# Patient Record
Sex: MALE | Race: WHITE | NOT HISPANIC OR LATINO | Employment: OTHER
[De-identification: names, ages, dates, MRNs, and addresses within clinical notes are randomized per-mention and may not be internally consistent; named-entity substitution may affect disease eponyms.]

---

## 2023-05-21 ENCOUNTER — HEALTH MAINTENANCE LETTER (OUTPATIENT)
Age: 38
End: 2023-05-21

## 2023-06-21 ENCOUNTER — OFFICE VISIT (OUTPATIENT)
Dept: FAMILY MEDICINE | Facility: CLINIC | Age: 38
End: 2023-06-21
Payer: MEDICARE

## 2023-06-21 VITALS
WEIGHT: 168.1 LBS | TEMPERATURE: 98.5 F | HEART RATE: 84 BPM | BODY MASS INDEX: 27.02 KG/M2 | SYSTOLIC BLOOD PRESSURE: 138 MMHG | OXYGEN SATURATION: 97 % | HEIGHT: 66 IN | DIASTOLIC BLOOD PRESSURE: 86 MMHG | RESPIRATION RATE: 10 BRPM

## 2023-06-21 DIAGNOSIS — K86.1 CHRONIC PANCREATITIS, UNSPECIFIED PANCREATITIS TYPE (H): ICD-10-CM

## 2023-06-21 DIAGNOSIS — R12 HEARTBURN: ICD-10-CM

## 2023-06-21 DIAGNOSIS — Z76.89 ENCOUNTER TO ESTABLISH CARE: Primary | ICD-10-CM

## 2023-06-21 DIAGNOSIS — E10.40 TYPE 1 DIABETES MELLITUS WITH DIABETIC NEUROPATHY (H): ICD-10-CM

## 2023-06-21 DIAGNOSIS — Z00.00 ENCOUNTER FOR MEDICARE ANNUAL WELLNESS EXAM: ICD-10-CM

## 2023-06-21 DIAGNOSIS — I10 HYPERTENSION, UNSPECIFIED TYPE: ICD-10-CM

## 2023-06-21 DIAGNOSIS — R12 HEARTBURN: Primary | ICD-10-CM

## 2023-06-21 DIAGNOSIS — L73.2 HIDRADENITIS SUPPURATIVA: ICD-10-CM

## 2023-06-21 DIAGNOSIS — F41.9 ANXIETY: ICD-10-CM

## 2023-06-21 DIAGNOSIS — K86.2 PANCREATIC CYST: ICD-10-CM

## 2023-06-21 DIAGNOSIS — F33.41 RECURRENT MAJOR DEPRESSIVE DISORDER, IN PARTIAL REMISSION (H): ICD-10-CM

## 2023-06-21 PROBLEM — K40.90 RIGHT INGUINAL HERNIA: Status: ACTIVE | Noted: 2018-10-02

## 2023-06-21 PROBLEM — F32.4 MAJOR DEPRESSIVE DISORDER, SINGLE EPISODE, IN PARTIAL REMISSION (H): Status: ACTIVE | Noted: 2017-06-05

## 2023-06-21 LAB
ALBUMIN SERPL BCG-MCNC: 4.4 G/DL (ref 3.5–5.2)
ALP SERPL-CCNC: 72 U/L (ref 40–129)
ALT SERPL W P-5'-P-CCNC: 39 U/L (ref 0–70)
ANION GAP SERPL CALCULATED.3IONS-SCNC: 9 MMOL/L (ref 7–15)
AST SERPL W P-5'-P-CCNC: 29 U/L (ref 0–45)
BILIRUB SERPL-MCNC: 0.4 MG/DL
BUN SERPL-MCNC: 21.7 MG/DL (ref 6–20)
CALCIUM SERPL-MCNC: 9.4 MG/DL (ref 8.6–10)
CHLORIDE SERPL-SCNC: 108 MMOL/L (ref 98–107)
CHOLEST SERPL-MCNC: 117 MG/DL
CREAT SERPL-MCNC: 0.67 MG/DL (ref 0.67–1.17)
DEPRECATED HCO3 PLAS-SCNC: 26 MMOL/L (ref 22–29)
GFR SERPL CREATININE-BSD FRML MDRD: >90 ML/MIN/1.73M2
GLUCOSE SERPL-MCNC: 57 MG/DL (ref 70–99)
HBA1C MFR BLD: 6.6 %
HDLC SERPL-MCNC: 53 MG/DL
LDLC SERPL CALC-MCNC: 56 MG/DL
NONHDLC SERPL-MCNC: 64 MG/DL
POTASSIUM SERPL-SCNC: 4.2 MMOL/L (ref 3.4–5.3)
PROT SERPL-MCNC: 7.2 G/DL (ref 6.4–8.3)
SODIUM SERPL-SCNC: 143 MMOL/L (ref 136–145)
TRIGL SERPL-MCNC: 38 MG/DL

## 2023-06-21 PROCEDURE — 80061 LIPID PANEL: CPT | Performed by: STUDENT IN AN ORGANIZED HEALTH CARE EDUCATION/TRAINING PROGRAM

## 2023-06-21 PROCEDURE — 80053 COMPREHEN METABOLIC PANEL: CPT | Performed by: STUDENT IN AN ORGANIZED HEALTH CARE EDUCATION/TRAINING PROGRAM

## 2023-06-21 PROCEDURE — 99207 PR FOOT EXAM NO CHARGE: CPT | Mod: 25 | Performed by: STUDENT IN AN ORGANIZED HEALTH CARE EDUCATION/TRAINING PROGRAM

## 2023-06-21 PROCEDURE — 99214 OFFICE O/P EST MOD 30 MIN: CPT | Mod: 25 | Performed by: STUDENT IN AN ORGANIZED HEALTH CARE EDUCATION/TRAINING PROGRAM

## 2023-06-21 PROCEDURE — G0438 PPPS, INITIAL VISIT: HCPCS | Performed by: STUDENT IN AN ORGANIZED HEALTH CARE EDUCATION/TRAINING PROGRAM

## 2023-06-21 PROCEDURE — 83036 HEMOGLOBIN GLYCOSYLATED A1C: CPT | Performed by: STUDENT IN AN ORGANIZED HEALTH CARE EDUCATION/TRAINING PROGRAM

## 2023-06-21 PROCEDURE — 36415 COLL VENOUS BLD VENIPUNCTURE: CPT | Performed by: STUDENT IN AN ORGANIZED HEALTH CARE EDUCATION/TRAINING PROGRAM

## 2023-06-21 RX ORDER — HUMAN INSULIN 100 [IU]/ML
25 INJECTION, SUSPENSION SUBCUTANEOUS 2 TIMES DAILY
Qty: 30 ML | Refills: 3 | Status: SHIPPED | OUTPATIENT
Start: 2023-06-21 | End: 2023-08-17

## 2023-06-21 RX ORDER — BUSPIRONE HYDROCHLORIDE 5 MG/1
1 TABLET ORAL 3 TIMES DAILY
COMMUNITY
Start: 2023-02-24 | End: 2023-06-21

## 2023-06-21 RX ORDER — ONDANSETRON 8 MG/1
TABLET, ORALLY DISINTEGRATING ORAL
COMMUNITY
Start: 2023-03-01 | End: 2023-06-21

## 2023-06-21 RX ORDER — DICYCLOMINE HYDROCHLORIDE 10 MG/1
10 CAPSULE ORAL
COMMUNITY
Start: 2023-02-24 | End: 2023-06-21

## 2023-06-21 RX ORDER — METOCLOPRAMIDE 5 MG/1
5 TABLET ORAL
COMMUNITY
Start: 2023-03-01 | End: 2023-06-21

## 2023-06-21 RX ORDER — LISINOPRIL 5 MG/1
1 TABLET ORAL DAILY
COMMUNITY
Start: 2023-03-01 | End: 2023-06-21

## 2023-06-21 RX ORDER — ATORVASTATIN CALCIUM 20 MG/1
20 TABLET, FILM COATED ORAL DAILY
Qty: 90 TABLET | Refills: 3 | Status: SHIPPED | OUTPATIENT
Start: 2023-06-21

## 2023-06-21 RX ORDER — LISINOPRIL 10 MG/1
10 TABLET ORAL DAILY
Qty: 90 TABLET | Refills: 3 | Status: SHIPPED | OUTPATIENT
Start: 2023-06-21

## 2023-06-21 RX ORDER — DULOXETIN HYDROCHLORIDE 20 MG/1
20 CAPSULE, DELAYED RELEASE ORAL DAILY
Qty: 90 CAPSULE | Refills: 3 | Status: SHIPPED | OUTPATIENT
Start: 2023-06-21

## 2023-06-21 RX ORDER — BUSPIRONE HYDROCHLORIDE 5 MG/1
5 TABLET ORAL 3 TIMES DAILY
Qty: 90 TABLET | Refills: 3 | Status: ON HOLD | OUTPATIENT
Start: 2023-06-21 | End: 2023-07-09

## 2023-06-21 RX ORDER — DOXYCYCLINE 100 MG/1
100 CAPSULE ORAL 2 TIMES DAILY
Qty: 14 CAPSULE | Refills: 0 | Status: SHIPPED | OUTPATIENT
Start: 2023-06-21 | End: 2023-06-28

## 2023-06-21 RX ORDER — ONDANSETRON 8 MG/1
TABLET, ORALLY DISINTEGRATING ORAL
Qty: 20 TABLET | Refills: 0 | Status: SHIPPED | OUTPATIENT
Start: 2023-06-21

## 2023-06-21 RX ORDER — BLOOD SUGAR DIAGNOSTIC
1 STRIP MISCELLANEOUS
COMMUNITY
Start: 2023-02-24

## 2023-06-21 RX ORDER — DICYCLOMINE HYDROCHLORIDE 10 MG/1
10 CAPSULE ORAL 4 TIMES DAILY PRN
Qty: 90 CAPSULE | Refills: 0 | Status: ON HOLD | OUTPATIENT
Start: 2023-06-21 | End: 2023-07-07

## 2023-06-21 RX ORDER — METOCLOPRAMIDE 5 MG/1
5 TABLET ORAL 3 TIMES DAILY PRN
Qty: 90 TABLET | Refills: 0 | Status: SHIPPED | OUTPATIENT
Start: 2023-06-21

## 2023-06-21 RX ORDER — GABAPENTIN 300 MG/1
300 CAPSULE ORAL 3 TIMES DAILY
Qty: 90 CAPSULE | Refills: 3 | Status: SHIPPED | OUTPATIENT
Start: 2023-06-21

## 2023-06-21 RX ORDER — HUMAN INSULIN 100 [IU]/ML
INJECTION, SUSPENSION SUBCUTANEOUS
COMMUNITY
Start: 2023-03-01 | End: 2023-06-21

## 2023-06-21 RX ORDER — BLOOD SUGAR DIAGNOSTIC
STRIP MISCELLANEOUS
COMMUNITY
Start: 2023-02-24

## 2023-06-21 RX ORDER — GABAPENTIN 300 MG/1
1 CAPSULE ORAL 3 TIMES DAILY
COMMUNITY
Start: 2023-02-24 | End: 2023-06-21

## 2023-06-21 RX ORDER — DULOXETIN HYDROCHLORIDE 20 MG/1
1 CAPSULE, DELAYED RELEASE ORAL DAILY
COMMUNITY
Start: 2023-02-24 | End: 2023-06-21

## 2023-06-21 ASSESSMENT — ENCOUNTER SYMPTOMS
CHILLS: 1
WEAKNESS: 1
SHORTNESS OF BREATH: 1
FEVER: 1
NAUSEA: 1
DIZZINESS: 1
MYALGIAS: 1
NERVOUS/ANXIOUS: 1
ABDOMINAL PAIN: 1
CONSTIPATION: 1

## 2023-06-21 ASSESSMENT — ACTIVITIES OF DAILY LIVING (ADL): CURRENT_FUNCTION: NO ASSISTANCE NEEDED

## 2023-06-21 NOTE — PROGRESS NOTES
"SUBJECTIVE:   CC: Elias is an 37 year old who presents for preventative health visit.        No data to display              Healthy Habits:     In general, how would you rate your overall health?  Fair    Frequency of exercise:  2-3 days/week    Duration of exercise:  15-30 minutes    Do you usually eat at least 4 servings of fruit and vegetables a day, include whole grains    & fiber and avoid regularly eating high fat or \"junk\" foods?  Yes    Taking medications regularly:  No    Barriers to taking medications:  None    Medication side effects:  Not applicable    Ability to successfully perform activities of daily living:  No assistance needed    Home Safety:  No safety concerns identified    Hearing Impairment:  No hearing concerns    In the past 6 months, have you been bothered by leaking of urine?  No    In general, how would you rate your overall mental or emotional health?  Good      PHQ-2 Total Score: 1    Additional concerns today:  Yes        {Add if <65 person on Medicare  - Required Questions (Optional):610816}  {Outside tests to abstract? :598840}    {additional problems to add (Optional):512312}    Have you ever done Advance Care Planning? (For example, a Health Directive, POLST, or a discussion with a medical provider or your loved ones about your wishes): { :091495}    Social History     Tobacco Use     Smoking status: Not on file     Smokeless tobacco: Not on file   Substance Use Topics     Alcohol use: Not on file     {Rooming staff  Click this link to complete the Prescreen if response below is not for today's visit  Alcohol Use Prescreen >3 drinks/day or > 7 drinks/week.  If the prescreen question answer is YES, complete the full AUDIT  :389549}         No data to display            {add AUDIT responses (Optional) (A score of 7 for adult men is an indication of hazardous drinking; a score of 8 or more is an indication of an alcohol use disorder.  A score of 7 or more for adult women is an " "indication of hazardous drinking or an alchohol use disorder):781935}    Last PSA: No results found for: PSA    Reviewed orders with patient. Reviewed health maintenance and updated orders accordingly - { :356548::\"Yes\"}  {Chronicprobdata (optional):209656}    Reviewed and updated as needed this visit by clinical staff                  Reviewed and updated as needed this visit by Provider                 {HISTORY OPTIONS (Optional):419851}    Review of Systems   Constitutional: Positive for chills and fever.   Eyes: Positive for visual disturbance.   Respiratory: Positive for shortness of breath.    Cardiovascular: Positive for chest pain.   Gastrointestinal: Positive for abdominal pain, constipation and nausea.   Genitourinary: Positive for genital sores, penile discharge and urgency.   Musculoskeletal: Positive for myalgias.   Neurological: Positive for dizziness and weakness.   Psychiatric/Behavioral: Positive for mood changes. The patient is nervous/anxious.      {MALE ROS (Optional):819949::\"CONSTITUTIONAL: NEGATIVE for fever, chills, change in weight\",\"INTEGUMENTARY/SKIN: NEGATIVE for worrisome rashes, moles or lesions\",\"EYES: NEGATIVE for vision changes or irritation\",\"ENT: NEGATIVE for ear, mouth and throat problems\",\"RESP: NEGATIVE for significant cough or SOB\",\"CV: NEGATIVE for chest pain, palpitations or peripheral edema\",\"GI: NEGATIVE for nausea, abdominal pain, heartburn, or change in bowel habits\",\" male: negative for dysuria, hematuria, decreased urinary stream, erectile dysfunction, urethral discharge\",\"MUSCULOSKELETAL: NEGATIVE for significant arthralgias or myalgia\",\"NEURO: NEGATIVE for weakness, dizziness or paresthesias\",\"PSYCHIATRIC: NEGATIVE for changes in mood or affect\"}    OBJECTIVE:   BP (!) 138/90   Pulse 84   Temp 98.5  F (36.9  C)   Resp 10   Ht 1.685 m (5' 6.34\")   Wt 76.2 kg (168 lb 1.6 oz)   SpO2 97%   BMI 26.86 kg/m      Physical Exam  {Exam Choices " "(Optional):767103}    {Diagnostic Test Results (Optional):838643::\"Diagnostic Test Results:\",\"Labs reviewed in Epic\"}    ASSESSMENT/PLAN:   {Diag Picklist:610654}    {Patient advised of split billing (Optional):638278}      COUNSELING:   {MALE COUNSELING MESSAGES:942739::\"Reviewed preventive health counseling, as reflected in patient instructions\"}        He has no history on file for tobacco use.      {Counseling Resources  [place] Preventive Services Task Force  Cholesterol Screening  Health diet/nutrition  Pooled Cohorts Equation Calculator  USDA's MyPlate  ASA Prophylaxis  Lung CA Screening  Osteoporosis prevention/bone health :522553}  {Prostate Cancer Screening  Consider for men 55-69 per guidance from USPSTF :992933}    Nahun Clemente MD  [facility]  "

## 2023-06-21 NOTE — PATIENT INSTRUCTIONS
Patient Education   Personalized Prevention Plan  You are due for the preventive services outlined below.  Your care team is available to assist you in scheduling these services.  If you have already completed any of these items, please share that information with your care team to update in your medical record.  Health Maintenance Due   Topic Date Due     Talk to your care team about options to quit tobacco use.  Never done     ANNUAL REVIEW OF HM ORDERS  Never done     Discuss Advance Care Planning  Never done     COVID-19 Vaccine (1) Never done     HIV Screening  Never done     Hepatitis C Screening  Never done     Cholesterol Lab  Never done     Your Health Risk Assessment indicates you feel you are not in good health    A healthy lifestyle helps keep the body fit and the mind alert. It helps protect you from disease, helps you fight disease, and helps prevent chronic disease (disease that doesn't go away) from getting worse. This is important as you get older and begin to notice twinges in muscles and joints and a decline in the strength and stamina you once took for granted. A healthy lifestyle includes good healthcare, good nutrition, weight control, recreation, and regular exercise. Avoid harmful substances and do what you can to keep safe. Another part of a healthy lifestyle is stay mentally active and socially involved.    Good healthcare     Have a wellness visit every year.     If you have new symptoms, let us know right away. Don't wait until the next checkup.     Take medicines exactly as prescribed and keep your medicines in a safe place. Tell us if your medicine causes problems.   Healthy diet and weight control     Eat 3 or 4 small, nutritious, low-fat, high-fiber meals a day. Include a variety of fruits, vegetables, and whole-grain foods.     Make sure you get enough calcium in your diet. Calcium, vitamin D, and exercise help prevent osteoporosis (bone thinning).     If you live alone, try eating  with others when you can. That way you get a good meal and have company while you eat it.     Try to keep a healthy weight. If you eat more calories than your body uses for energy, it will be stored as fat and you will gain weight.     Recreation   Recreation is not limited to sports and team events. It includes any activity that provides relaxation, interest, enjoyment, and exercise. Recreation provides an outlet for physical, mental, and social energy. It can give a sense of worth and achievement. It can help you stay healthy.    Mental Exercise and Social Involvement  Mental and emotional health is as important as physical health. Keep in touch with friends and family. Stay as active as possible. Continue to learn and challenge yourself.   Things you can do to stay mentally active are:    Learn something new, like a foreign language or musical instrument.     Play SCRABBLE or do crossword puzzles. If you cannot find people to play these games with you at home, you can play them with others on your computer through the Internet.     Join a games club--anything from card games to chess or checkers or lawn bowling.     Start a new hobby.     Go back to school.     Volunteer.     Read.   Keep up with world events.

## 2023-06-21 NOTE — PROGRESS NOTES
"SUBJECTIVE:   Elias is a 37 year old who presents for Preventive Visit.      6/21/2023    11:08 AM   Additional Questions   Roomed by Jerri guerra     Are you in the first 12 months of your Medicare coverage?  No    Healthy Habits:     In general, how would you rate your overall health?  Fair    Frequency of exercise:  2-3 days/week    Duration of exercise:  15-30 minutes    Do you usually eat at least 4 servings of fruit and vegetables a day, include whole grains    & fiber and avoid regularly eating high fat or \"junk\" foods?  Yes    Taking medications regularly:  No    Barriers to taking medications:  None    Medication side effects:  Not applicable    Ability to successfully perform activities of daily living:  No assistance needed    In general, how would you rate your overall mental or emotional health?  Good      PHQ-2 Total Score: 1    Additional concerns today:  Yes      Notes being disabled since 2016 related to hernia and pancreatitis. Gallbladder removed in 2013 and notes issues with pancreas since. No alcohol use hx or other drug hx. Was a smoker but quit last September. Moved from [place] this March with wife. Notes being on permanent disability and has had that switched over. Notes sugars to be wonderful since move. Most recent outside A1c that I can see is 7.6. Pancrease flares up sporadically with straight through pain. Nausea and vomiting with the pancreas. Previously prescribed 25 units twice daily but taking 21 units with good home sugars. No continuous monitor. Does have sliding scale as well.       Have you ever done Advance Care Planning? (For example, a Health Directive, POLST, or a discussion with a medical provider or your loved ones about your wishes): No, advance care planning information given to patient to review.  Advanced care planning was discussed at today's visit.      Fall risk       Cognitive Screening   1) Repeat 3 items (Leader, Season, Table)    2) Clock draw: NORMAL  3) 3 item " recall: Recalls 2 objects   Results: NORMAL clock, 1-2 items recalled: COGNITIVE IMPAIRMENT LESS LIKELY    Mini-CogTM Copyright FREDA Stark. Licensed by the author for use in [facility]; reprinted with permission ([email or web address]). All rights reserved.      Do you have sleep apnea, excessive snoring or daytime drowsiness?: no    Reviewed and updated as needed this visit by clinical staff   Tobacco                Reviewed and updated as needed this visit by Provider                 Social History     Tobacco Use     Smoking status: Former     Types: Cigarettes     Quit date: 2022     Years since quittin.8     Smokeless tobacco: Never   Substance Use Topics     Alcohol use: Not on file             2023    11:10 AM   Alcohol Use   Prescreen: >3 drinks/day or >7 drinks/week? No       Patient Care Team:  No Ref-Primary, Physician as PCP - General    The following health maintenance items are reviewed in Epic and correct as of today:  Health Maintenance   Topic Date Due     NICOTINE/TOBACCO CESSATION COUNSELING Q 1 YR  Never done     MICROALBUMIN  Never done     ANNUAL REVIEW OF  ORDERS  Never done     DEPRESSION ACTION PLAN  Never done     EYE EXAM  Never done     PHQ-9  Never done     COVID-19 Vaccine (1) Never done     HIV SCREENING  Never done     HEPATITIS C SCREENING  Never done     Pneumococcal Vaccine: Pediatrics (0 to 5 Years) and At-Risk Patients (6 to 64 Years) (2 - PCV) 06/10/2017     INFLUENZA VACCINE (Season Ended) 2023     A1C  2023     MEDICARE ANNUAL WELLNESS VISIT  2024     BMP  2024     LIPID  2024     DIABETIC FOOT EXAM  2024     DTAP/TDAP/TD IMMUNIZATION (7 - Td or Tdap) 03/10/2026     ADVANCE CARE PLANNING  2028     IPV IMMUNIZATION  Completed     HEPATITIS B IMMUNIZATION  Completed     MENINGITIS IMMUNIZATION  Aged Out            Review of Systems   Constitutional: Positive for chills and fever.   Eyes: Positive for visual disturbance.  "  Respiratory: Positive for shortness of breath.    Cardiovascular: Positive for chest pain.   Gastrointestinal: Positive for abdominal pain, constipation and nausea.   Genitourinary: Positive for genital sores, penile discharge and urgency.   Musculoskeletal: Positive for myalgias.   Neurological: Positive for dizziness and weakness.   Psychiatric/Behavioral: Positive for mood changes. The patient is nervous/anxious.        OBJECTIVE:   /86   Pulse 84   Temp 98.5  F (36.9  C)   Resp 10   Ht 1.685 m (5' 6.34\")   Wt 76.2 kg (168 lb 1.6 oz)   SpO2 97%   BMI 26.86 kg/m   Estimated body mass index is 26.86 kg/m  as calculated from the following:    Height as of this encounter: 1.685 m (5' 6.34\").    Weight as of this encounter: 76.2 kg (168 lb 1.6 oz).  Physical Exam  GENERAL: healthy, alert and no distress  EYES: Eyes grossly normal to inspection, PERRL and conjunctivae and sclerae normal  HENT: nose and mouth without ulcers or lesions  NECK: no adenopathy, no asymmetry, masses, or scars and thyroid normal to palpation  RESP: lungs clear to auscultation - no rales, rhonchi or wheezes  CV: regular rate and rhythm, normal S1 S2, no S3 or S4, no murmur, click or rub, no peripheral edema and peripheral pulses strong  ABDOMEN: soft, nontender, no hepatosplenomegaly, no masses and bowel sounds normal  MS: no gross musculoskeletal defects noted, no edema, diabetic foot exam with sensation intact to monofilament test  SKIN: Bilateral thighs and scrotum with cystic some nodular healing lesions with drainage behind right scrotum lesion, no penile discharge or lesion  NEURO: mentation intact and speech normal  PSYCH: mentation appears normal, affect normal/bright    Diagnostic Test Results:  Labs reviewed in Epic    ASSESSMENT / PLAN:   Elias was seen today for physical.    Diagnoses and all orders for this visit:    Encounter to establish care    Type 1 diabetes mellitus with diabetic neuropathy (H)  Patient with " excellent control on his diabetes with home insulin.  Classified as type I in the past due to his chronic pancreatitis and insulin dependency.  Did not tolerate other orals previously.  Current regimen seems to have sugars in excellent control with 21 units of NPH twice daily with sliding scale.  He is unsure of type of sliding scale insulin he is on so he will get this checked.  Has had lows in the past but nothing consistently now.  Has been on lisinopril and will increase now given slight elevation in blood pressure.  Discussed addition of statin and he is willing to do so now given his increased risk.  Continue gabapentin for neuropathy type symptoms although his foot exam and sensation appear to be intact well now and symptoms come and go. Follow up in 1-2 months. Recommend eye exam.   -     Comprehensive metabolic panel (BMP + Alb, Alk Phos, ALT, AST, Total. Bili, TP); Future  -     Hemoglobin A1c; Future  -     gabapentin (NEURONTIN) 300 MG capsule; Take 1 capsule (300 mg) by mouth 3 times daily  -     lisinopril (ZESTRIL) 10 MG tablet; Take 1 tablet (10 mg) by mouth daily  -     FOOT EXAM  -     Lipid panel reflex to direct LDL Non-fasting; Future  -     atorvastatin (LIPITOR) 20 MG tablet; Take 1 tablet (20 mg) by mouth daily  -     insulin NPH (NOVOLIN N VIAL) 100 UNIT/ML vial; Inject 25 Units Subcutaneous 2 times daily  -     Comprehensive metabolic panel (BMP + Alb, Alk Phos, ALT, AST, Total. Bili, TP)  -     Hemoglobin A1c  -     Lipid panel reflex to direct LDL Non-fasting    Chronic pancreatitis, unspecified pancreatitis type (H)  Chronic recurrent symptoms.  Does not appear to have exocrine pancreatic insufficiency as he is not on Creon currently but does note being on this in the past.  Usually his diet goes well without significant symptoms.  Flares of his left-sided pain random and does use Zofran and Reglan as needed during those times.  It does not look like he has seen GI in quite some time.   We did discuss potential follow-up with GI in the future but he will hold off now.  -     dicyclomine (BENTYL) 10 MG capsule; Take 1 capsule (10 mg) by mouth 4 times daily as needed (prior to eating)  -     metoclopramide (REGLAN) 5 MG tablet; Take 1 tablet (5 mg) by mouth 3 times daily as needed (with meals as needed)  -     ondansetron (ZOFRAN ODT) 8 MG ODT tab; Dissolve 1 tablet in mouth every 8 to 12 hours as needed for nausea or vomiting    Pancreatic cyst  Previous MR 8/23/2022 upon outside records.  Cyst stability noted recommend possible repeat CT in 2 years.    Recurrent major depressive disorder, in partial remission (H)  Anxiety  Note stability with Cymbalta and BuSpar like continue this now.  Will let me know if he needs resources and following up with psychology in the future.  -     busPIRone (BUSPAR) 5 MG tablet; Take 1 tablet (5 mg) by mouth 3 times daily  -     DULoxetine (CYMBALTA) 20 MG capsule; Take 1 capsule (20 mg) by mouth daily    Encounter for Medicare annual wellness exam  -     PRIMARY CARE FOLLOW-UP SCHEDULING; Future    Hypertension, unspecified type  Increase lisinopril to 10 mg daily  -     lisinopril (ZESTRIL) 10 MG tablet; Take 1 tablet (10 mg) by mouth daily    Heartburn  -     omeprazole (PRILOSEC) 20 MG DR capsule; Take 1 capsule (20 mg) by mouth 2 times daily as needed (heartburn)    Hidradenitis suppurativa  Previous diagnosis of at bedtime usually in his groin is where he has symptoms.  Current flare and plan for doxycycline now.  Consider follow-up with dermatology in the future.  -     doxycycline hyclate (VIBRAMYCIN) 100 MG capsule; Take 1 capsule (100 mg) by mouth 2 times daily for 7 days        Patient has been advised of split billing requirements and indicates understanding: Yes      COUNSELING:   Reviewed preventive health counseling, as reflected in patient instructions       Regular exercise       Healthy diet/nutrition       Vision screening       Hearing screening    "    Dental care       Fall risk prevention       Immunizations       Aspirin prophylaxis        Alcohol Use        Hepatitis C screening       HIV screening for high risk patient       Colon cancer screening       Prostate cancer screening      BMI:   Estimated body mass index is 26.86 kg/m  as calculated from the following:    Height as of this encounter: 1.685 m (5' 6.34\").    Weight as of this encounter: 76.2 kg (168 lb 1.6 oz).       He reports that he quit smoking about 9 months ago. His smoking use included cigarettes. He has never used smokeless tobacco.      Appropriate preventive services were discussed with this patient, including applicable screening as appropriate for cardiovascular disease, diabetes, osteopenia/osteoporosis, and glaucoma.  As appropriate for age/gender, discussed screening for colorectal cancer, prostate cancer, breast cancer, and cervical cancer. Checklist reviewing preventive services available has been given to the patient.    Reviewed patients plan of care and provided an AVS. The Basic Care Plan (routine screening as documented in Health Maintenance) for Elias meets the Care Plan requirement. This Care Plan has been established and reviewed with the Patient.          Nahun Clemente MD  [facility]    Identified Health Risks:      The patient was provided with suggestions to help him develop a healthy physical lifestyle.  "

## 2023-06-25 ENCOUNTER — MYC MEDICAL ADVICE (OUTPATIENT)
Dept: FAMILY MEDICINE | Facility: CLINIC | Age: 38
End: 2023-06-25
Payer: MEDICARE

## 2023-06-26 RX ORDER — OMEPRAZOLE 40 MG/1
40 CAPSULE, DELAYED RELEASE ORAL DAILY
Qty: 90 CAPSULE | Refills: 3 | Status: ON HOLD | OUTPATIENT
Start: 2023-06-26 | End: 2023-07-09

## 2023-06-27 ENCOUNTER — HOSPITAL ENCOUNTER (EMERGENCY)
Facility: CLINIC | Age: 38
Discharge: HOME | End: 2023-06-27
Attending: FAMILY MEDICINE | Admitting: FAMILY MEDICINE
Payer: MEDICARE

## 2023-06-27 VITALS
OXYGEN SATURATION: 98 % | BODY MASS INDEX: 26.33 KG/M2 | HEART RATE: 64 BPM | RESPIRATION RATE: 20 BRPM | SYSTOLIC BLOOD PRESSURE: 145 MMHG | DIASTOLIC BLOOD PRESSURE: 92 MMHG | WEIGHT: 164.8 LBS | TEMPERATURE: 98.3 F

## 2023-06-27 DIAGNOSIS — K86.1 ACUTE ON CHRONIC PANCREATITIS (H): ICD-10-CM

## 2023-06-27 DIAGNOSIS — K85.90 ACUTE ON CHRONIC PANCREATITIS (H): ICD-10-CM

## 2023-06-27 LAB
ALBUMIN SERPL BCG-MCNC: 4 G/DL (ref 3.5–5.2)
ALP SERPL-CCNC: 64 U/L (ref 40–129)
ALT SERPL W P-5'-P-CCNC: 32 U/L (ref 0–70)
AMYLASE SERPL-CCNC: 41 U/L (ref 28–100)
ANION GAP SERPL CALCULATED.3IONS-SCNC: 10 MMOL/L (ref 7–15)
AST SERPL W P-5'-P-CCNC: 21 U/L (ref 0–45)
B-OH-BUTYR SERPL-SCNC: 0.4 MMOL/L
BASE EXCESS BLDV CALC-SCNC: 2.4 MMOL/L (ref -7.7–1.9)
BASOPHILS # BLD AUTO: 0.1 10E3/UL (ref 0–0.2)
BASOPHILS NFR BLD AUTO: 1 %
BILIRUB SERPL-MCNC: 0.5 MG/DL
BUN SERPL-MCNC: 16.5 MG/DL (ref 6–20)
CALCIUM SERPL-MCNC: 9.1 MG/DL (ref 8.6–10)
CHLORIDE SERPL-SCNC: 107 MMOL/L (ref 98–107)
CREAT SERPL-MCNC: 0.61 MG/DL (ref 0.67–1.17)
DEPRECATED HCO3 PLAS-SCNC: 25 MMOL/L (ref 22–29)
EOSINOPHIL # BLD AUTO: 0.1 10E3/UL (ref 0–0.7)
EOSINOPHIL NFR BLD AUTO: 1 %
ERYTHROCYTE [DISTWIDTH] IN BLOOD BY AUTOMATED COUNT: 12.7 % (ref 10–15)
ETHANOL SERPL-MCNC: <0.01 G/DL
GFR SERPL CREATININE-BSD FRML MDRD: >90 ML/MIN/1.73M2
GLUCOSE SERPL-MCNC: 156 MG/DL (ref 70–99)
HCO3 BLDV-SCNC: 27 MMOL/L (ref 21–28)
HCT VFR BLD AUTO: 42.4 % (ref 40–53)
HGB BLD-MCNC: 14.8 G/DL (ref 13.3–17.7)
IMM GRANULOCYTES # BLD: 0 10E3/UL
IMM GRANULOCYTES NFR BLD: 0 %
LIPASE SERPL-CCNC: 40 U/L (ref 13–60)
LYMPHOCYTES # BLD AUTO: 1.7 10E3/UL (ref 0.8–5.3)
LYMPHOCYTES NFR BLD AUTO: 22 %
MAGNESIUM SERPL-MCNC: 1.7 MG/DL (ref 1.7–2.3)
MCH RBC QN AUTO: 31.5 PG (ref 26.5–33)
MCHC RBC AUTO-ENTMCNC: 34.9 G/DL (ref 31.5–36.5)
MCV RBC AUTO: 90 FL (ref 78–100)
MONOCYTES # BLD AUTO: 0.4 10E3/UL (ref 0–1.3)
MONOCYTES NFR BLD AUTO: 6 %
NEUTROPHILS # BLD AUTO: 5.3 10E3/UL (ref 1.6–8.3)
NEUTROPHILS NFR BLD AUTO: 70 %
NRBC # BLD AUTO: 0 10E3/UL
NRBC BLD AUTO-RTO: 0 /100
O2/TOTAL GAS SETTING VFR VENT: 21 %
PCO2 BLDV: 43 MM HG (ref 40–50)
PH BLDV: 7.42 [PH] (ref 7.32–7.43)
PLATELET # BLD AUTO: 271 10E3/UL (ref 150–450)
PO2 BLDV: 52 MM HG (ref 25–47)
POTASSIUM SERPL-SCNC: 4 MMOL/L (ref 3.4–5.3)
PROT SERPL-MCNC: 7 G/DL (ref 6.4–8.3)
RBC # BLD AUTO: 4.7 10E6/UL (ref 4.4–5.9)
SODIUM SERPL-SCNC: 142 MMOL/L (ref 136–145)
WBC # BLD AUTO: 7.6 10E3/UL (ref 4–11)

## 2023-06-27 PROCEDURE — 82077 ASSAY SPEC XCP UR&BREATH IA: CPT | Performed by: FAMILY MEDICINE

## 2023-06-27 PROCEDURE — 85025 COMPLETE CBC W/AUTO DIFF WBC: CPT | Performed by: FAMILY MEDICINE

## 2023-06-27 PROCEDURE — 82150 ASSAY OF AMYLASE: CPT | Performed by: FAMILY MEDICINE

## 2023-06-27 PROCEDURE — 96374 THER/PROPH/DIAG INJ IV PUSH: CPT | Performed by: FAMILY MEDICINE

## 2023-06-27 PROCEDURE — 80053 COMPREHEN METABOLIC PANEL: CPT | Performed by: FAMILY MEDICINE

## 2023-06-27 PROCEDURE — 36415 COLL VENOUS BLD VENIPUNCTURE: CPT | Performed by: FAMILY MEDICINE

## 2023-06-27 PROCEDURE — 258N000003 HC RX IP 258 OP 636: Performed by: FAMILY MEDICINE

## 2023-06-27 PROCEDURE — 96361 HYDRATE IV INFUSION ADD-ON: CPT | Performed by: FAMILY MEDICINE

## 2023-06-27 PROCEDURE — 82010 KETONE BODYS QUAN: CPT | Performed by: FAMILY MEDICINE

## 2023-06-27 PROCEDURE — 250N000011 HC RX IP 250 OP 636: Mod: JZ | Performed by: FAMILY MEDICINE

## 2023-06-27 PROCEDURE — 96375 TX/PRO/DX INJ NEW DRUG ADDON: CPT | Performed by: FAMILY MEDICINE

## 2023-06-27 PROCEDURE — 82803 BLOOD GASES ANY COMBINATION: CPT | Performed by: FAMILY MEDICINE

## 2023-06-27 PROCEDURE — 99284 EMERGENCY DEPT VISIT MOD MDM: CPT | Mod: 25 | Performed by: FAMILY MEDICINE

## 2023-06-27 PROCEDURE — 99284 EMERGENCY DEPT VISIT MOD MDM: CPT | Performed by: FAMILY MEDICINE

## 2023-06-27 PROCEDURE — 83690 ASSAY OF LIPASE: CPT | Performed by: FAMILY MEDICINE

## 2023-06-27 PROCEDURE — 83735 ASSAY OF MAGNESIUM: CPT | Performed by: FAMILY MEDICINE

## 2023-06-27 RX ORDER — LORAZEPAM 2 MG/ML
1 INJECTION INTRAMUSCULAR ONCE
Status: COMPLETED | OUTPATIENT
Start: 2023-06-27 | End: 2023-06-27

## 2023-06-27 RX ORDER — PROCHLORPERAZINE MALEATE 10 MG
10 TABLET ORAL EVERY 6 HOURS PRN
Qty: 20 TABLET | Refills: 0 | Status: SHIPPED | OUTPATIENT
Start: 2023-06-27

## 2023-06-27 RX ORDER — KETOROLAC TROMETHAMINE 30 MG/ML
30 INJECTION, SOLUTION INTRAMUSCULAR; INTRAVENOUS ONCE
Status: COMPLETED | OUTPATIENT
Start: 2023-06-27 | End: 2023-06-27

## 2023-06-27 RX ORDER — SODIUM CHLORIDE 9 MG/ML
INJECTION, SOLUTION INTRAVENOUS CONTINUOUS
Status: DISCONTINUED | OUTPATIENT
Start: 2023-06-27 | End: 2023-06-27 | Stop reason: HOSPADM

## 2023-06-27 RX ADMIN — KETOROLAC TROMETHAMINE 30 MG: 30 INJECTION, SOLUTION INTRAMUSCULAR; INTRAVENOUS at 08:39

## 2023-06-27 RX ADMIN — SODIUM CHLORIDE: 9 INJECTION, SOLUTION INTRAVENOUS at 09:18

## 2023-06-27 RX ADMIN — SODIUM CHLORIDE 1000 ML: 9 INJECTION, SOLUTION INTRAVENOUS at 08:36

## 2023-06-27 RX ADMIN — LORAZEPAM 1 MG: 2 INJECTION INTRAMUSCULAR; INTRAVENOUS at 08:41

## 2023-06-27 ASSESSMENT — ACTIVITIES OF DAILY LIVING (ADL)
ADLS_ACUITY_SCORE: 35
ADLS_ACUITY_SCORE: 35

## 2023-06-27 NOTE — ED TRIAGE NOTES
Pt reports hx of pancreatitis and reports he is having a flare, he has left sided abdominal pain, shortness of breath and feels nauseated, started yesterday

## 2023-06-27 NOTE — ED PROVIDER NOTES
History     Chief Complaint   Patient presents with     Abdominal Pain     HPI  Elias Garcia is a 37 year old male who presents with concerns of his pancreatitis acting up again.  Patient has a history of chronic pancreatitis and type 1 diabetes, patient is also had a history of a pancreatic pseudocyst.  Patient's had 2 days of severe left-sided abdominal pain, multiple dry heaves and feeling shaky and not well.  Patient has chronic pancreatitis and has new here from [place].  Patient states when he had flareups before a lot of times they either keep in the hospital for weeks or cinema what pain meds any using never feels better.  Since coming here his doctor recently put him on a course of doxycycline for hidradenitis suppurativa, he is wondering if this could have flared up his pancreatitis.  He stopped taking it a few days ago because he just could not tolerate it.  Denies any recent fevers or chills.  Patient states that his urine has been a different color but has not seen any blood in it.  Patient is supposed to be taking Bentyl and Zofran for his pancreatitis and pain but it was not helping yesterday.    Allergies:  Allergies   Allergen Reactions     Sulfa Antibiotics Hives       Problem List:    Patient Active Problem List    Diagnosis Date Noted     Right inguinal hernia 10/02/2018     Priority: Medium     Anxiety 09/04/2018     Priority: Medium     Major depressive disorder, single episode, in partial remission (H) 06/05/2017     Priority: Medium     Hx of cholecystectomy 01/04/2016     Priority: Medium     Formatting of this note might be different from the original.  Pancreatitis 2/2013       Pseudocyst, pancreas 03/19/2013     Priority: Medium     HTN (hypertension) 03/12/2013     Priority: Medium        Past Medical History:    History reviewed. No pertinent past medical history.    Past Surgical History:    Past Surgical History:   Procedure Laterality Date     CHOLECYSTECTOMY         Family  History:    No family history on file.    Social History:  Marital Status:  Single [1]  Social History     Tobacco Use     Smoking status: Some Days     Types: Cigarettes, Vaping Device     Last attempt to quit: 2022     Years since quittin.8     Smokeless tobacco: Never   Vaping Use     Vaping Use: Some days     Substances: Nicotine     Devices: Disposable   Substance Use Topics     Alcohol use: Not Currently        Medications:    atorvastatin (LIPITOR) 20 MG tablet  blood glucose (ONETOUCH VERIO IQ) test strip  blood glucose (ONETOUCH VERIO IQ) test strip  busPIRone (BUSPAR) 5 MG tablet  dicyclomine (BENTYL) 10 MG capsule  doxycycline hyclate (VIBRAMYCIN) 100 MG capsule  DULoxetine (CYMBALTA) 20 MG capsule  gabapentin (NEURONTIN) 300 MG capsule  insulin NPH (NOVOLIN N VIAL) 100 UNIT/ML vial  lisinopril (ZESTRIL) 10 MG tablet  metoclopramide (REGLAN) 5 MG tablet  omeprazole (PRILOSEC) 20 MG DR capsule  omeprazole (PRILOSEC) 40 MG DR capsule  ondansetron (ZOFRAN ODT) 8 MG ODT tab  prochlorperazine (COMPAZINE) 10 MG tablet          Review of Systems   All other systems reviewed and are negative.      Physical Exam   BP: (!) 141/95  Pulse: 85  Temp: 98.3  F (36.8  C)  Resp: 20  Weight: 74.8 kg (164 lb 12.8 oz)  SpO2: 98 %      Physical Exam  Vitals and nursing note reviewed.   Constitutional:       General: He is not in acute distress.     Appearance: He is well-developed. He is not diaphoretic.   HENT:      Head: Normocephalic and atraumatic.      Nose: Nose normal.      Mouth/Throat:      Pharynx: No oropharyngeal exudate.   Eyes:      General: No scleral icterus.     Conjunctiva/sclera: Conjunctivae normal.      Pupils: Pupils are equal, round, and reactive to light.   Cardiovascular:      Rate and Rhythm: Normal rate and regular rhythm.      Heart sounds: Normal heart sounds. No murmur heard.     No friction rub.   Pulmonary:      Effort: Pulmonary effort is normal. No respiratory distress.      Breath  sounds: Normal breath sounds. No wheezing or rales.   Abdominal:      General: Bowel sounds are normal. There is no distension.      Palpations: Abdomen is soft. There is no mass.      Tenderness: There is abdominal tenderness in the left upper quadrant and left lower quadrant. There is no guarding or rebound.   Musculoskeletal:         General: No tenderness. Normal range of motion.      Cervical back: Normal range of motion.   Skin:     General: Skin is warm.      Capillary Refill: Capillary refill takes less than 2 seconds.      Findings: No rash.   Neurological:      Mental Status: He is alert and oriented to person, place, and time.   Psychiatric:         Judgment: Judgment normal.         ED Course                 Procedures      Results for orders placed or performed during the hospital encounter of 06/27/23 (from the past 24 hour(s))   CBC with platelets differential    Narrative    The following orders were created for panel order CBC with platelets differential.  Procedure                               Abnormality         Status                     ---------                               -----------         ------                     CBC with platelets and d...[[id number]]                      Final result                 Please view results for these tests on the individual orders.   Comprehensive metabolic panel   Result Value Ref Range    Sodium 142 136 - 145 mmol/L    Potassium 4.0 3.4 - 5.3 mmol/L    Chloride 107 98 - 107 mmol/L    Carbon Dioxide (CO2) 25 22 - 29 mmol/L    Anion Gap 10 7 - 15 mmol/L    Urea Nitrogen 16.5 6.0 - 20.0 mg/dL    Creatinine 0.61 (L) 0.67 - 1.17 mg/dL    Calcium 9.1 8.6 - 10.0 mg/dL    Glucose 156 (H) 70 - 99 mg/dL    Alkaline Phosphatase 64 40 - 129 U/L    AST 21 0 - 45 U/L    ALT 32 0 - 70 U/L    Protein Total 7.0 6.4 - 8.3 g/dL    Albumin 4.0 3.5 - 5.2 g/dL    Bilirubin Total 0.5 <=1.2 mg/dL    GFR Estimate >90 >60 mL/min/1.73m2   Lipase   Result Value Ref Range    Lipase  40 13 - 60 U/L   Amylase   Result Value Ref Range    Amylase 41 28 - 100 U/L   Magnesium   Result Value Ref Range    Magnesium 1.7 1.7 - 2.3 mg/dL   Ethyl Alcohol Level   Result Value Ref Range    Alcohol ethyl <0.01 <=0.01 g/dL   Ketone Beta-Hydroxybutyrate Quantitative   Result Value Ref Range    Ketone (Beta-Hydroxybutyrate) Quantitative 0.40 (H) <=0.30 mmol/L   Blood gas venous   Result Value Ref Range    pH Venous 7.42 7.32 - 7.43    pCO2 Venous 43 40 - 50 mm Hg    pO2 Venous 52 (H) 25 - 47 mm Hg    Bicarbonate Venous 27 21 - 28 mmol/L    Base Excess/Deficit (+/-) 2.4 (H) -7.7 - 1.9 mmol/L    FIO2 21    CBC with platelets and differential   Result Value Ref Range    WBC Count 7.6 4.0 - 11.0 10e3/uL    RBC Count 4.70 4.40 - 5.90 10e6/uL    Hemoglobin 14.8 13.3 - 17.7 g/dL    Hematocrit 42.4 40.0 - 53.0 %    MCV 90 78 - 100 fL    MCH 31.5 26.5 - 33.0 pg    MCHC 34.9 31.5 - 36.5 g/dL    RDW 12.7 10.0 - 15.0 %    Platelet Count 271 150 - 450 10e3/uL    % Neutrophils 70 %    % Lymphocytes 22 %    % Monocytes 6 %    % Eosinophils 1 %    % Basophils 1 %    % Immature Granulocytes 0 %    NRBCs per 100 WBC 0 <1 /100    Absolute Neutrophils 5.3 1.6 - 8.3 10e3/uL    Absolute Lymphocytes 1.7 0.8 - 5.3 10e3/uL    Absolute Monocytes 0.4 0.0 - 1.3 10e3/uL    Absolute Eosinophils 0.1 0.0 - 0.7 10e3/uL    Absolute Basophils 0.1 0.0 - 0.2 10e3/uL    Absolute Immature Granulocytes 0.0 <=0.4 10e3/uL    Absolute NRBCs 0.0 10e3/uL       Medications   sodium chloride 0.9% infusion ( Intravenous $New Bag 6/27/23 9326)   ketorolac (TORADOL) injection 30 mg (30 mg Intravenous $Given 6/27/23 0353)   LORazepam (ATIVAN) injection 1 mg (1 mg Intravenous $Given 6/27/23 8097)   0.9% sodium chloride BOLUS (0 mLs Intravenous Stopped 6/27/23 3678)       Labs have come back and are mostly unremarkable.  Lipase and amylase are both normal.  Patient did have some ketones in his urine and he is a type I diabetic but pH was normal, no signs of  acidosis.  This is likely from some starvation ketosis with him not eating or drinking the greatest.  Patient is feeling significantly better after the above medications were given.  I think if we can keep his nausea under control think his symptoms will continue to get better.  We will send him home with some oral Compazine to see if this may be works better.  Patient was told to follow-up with his primary care doctor if things or not improving over the next few days.      Assessments & Plan (with Medical Decision Making)  Chronic pancreatitis     I have reviewed the nursing notes.    I have reviewed the findings, diagnosis, plan and need for follow up with the patient.      New Prescriptions    PROCHLORPERAZINE (COMPAZINE) 10 MG TABLET    Take 1 tablet (10 mg) by mouth every 6 hours as needed for nausea or vomiting       Final diagnoses:   Acute on chronic pancreatitis (H)       6/27/2023   [facility] EMERGENCY DEPT     Jed Fam MD  06/27/23 5847

## 2023-06-29 ENCOUNTER — APPOINTMENT (OUTPATIENT)
Dept: CT IMAGING | Facility: CLINIC | Age: 38
End: 2023-06-29
Attending: PHYSICIAN ASSISTANT
Payer: MEDICARE

## 2023-06-29 ENCOUNTER — HOSPITAL ENCOUNTER (EMERGENCY)
Facility: CLINIC | Age: 38
Discharge: ANOTHER HOSPITAL | End: 2023-06-29
Attending: PHYSICIAN ASSISTANT | Admitting: PHYSICIAN ASSISTANT
Payer: MEDICARE

## 2023-06-29 VITALS
TEMPERATURE: 98.2 F | OXYGEN SATURATION: 98 % | HEIGHT: 66 IN | HEART RATE: 59 BPM | SYSTOLIC BLOOD PRESSURE: 118 MMHG | BODY MASS INDEX: 25.88 KG/M2 | WEIGHT: 161 LBS | RESPIRATION RATE: 20 BRPM | DIASTOLIC BLOOD PRESSURE: 71 MMHG

## 2023-06-29 DIAGNOSIS — Z87.19 H/O CHRONIC PANCREATITIS: ICD-10-CM

## 2023-06-29 DIAGNOSIS — R10.13 ABDOMINAL PAIN, EPIGASTRIC: ICD-10-CM

## 2023-06-29 DIAGNOSIS — K92.2 UPPER GI BLEED: ICD-10-CM

## 2023-06-29 DIAGNOSIS — R11.2 NAUSEA AND VOMITING: ICD-10-CM

## 2023-06-29 LAB
ALBUMIN SERPL BCG-MCNC: 4.5 G/DL (ref 3.5–5.2)
ALBUMIN UR-MCNC: NEGATIVE MG/DL
ALP SERPL-CCNC: 71 U/L (ref 40–129)
ALT SERPL W P-5'-P-CCNC: 33 U/L (ref 0–70)
ANION GAP SERPL CALCULATED.3IONS-SCNC: 13 MMOL/L (ref 7–15)
APPEARANCE UR: ABNORMAL
AST SERPL W P-5'-P-CCNC: 28 U/L (ref 0–45)
B-OH-BUTYR SERPL-SCNC: 0.7 MMOL/L
BASE EXCESS BLDV CALC-SCNC: 3.4 MMOL/L (ref -7.7–1.9)
BASOPHILS # BLD AUTO: 0.1 10E3/UL (ref 0–0.2)
BASOPHILS NFR BLD AUTO: 1 %
BILIRUB SERPL-MCNC: 1.1 MG/DL
BILIRUB UR QL STRIP: NEGATIVE
BUN SERPL-MCNC: 18.2 MG/DL (ref 6–20)
CALCIUM SERPL-MCNC: 9.7 MG/DL (ref 8.6–10)
CHLORIDE SERPL-SCNC: 106 MMOL/L (ref 98–107)
COLOR UR AUTO: YELLOW
CREAT SERPL-MCNC: 0.69 MG/DL (ref 0.67–1.17)
CRP SERPL-MCNC: <3 MG/L
DEPRECATED HCO3 PLAS-SCNC: 23 MMOL/L (ref 22–29)
EOSINOPHIL # BLD AUTO: 0 10E3/UL (ref 0–0.7)
EOSINOPHIL NFR BLD AUTO: 0 %
ERYTHROCYTE [DISTWIDTH] IN BLOOD BY AUTOMATED COUNT: 12.6 % (ref 10–15)
ERYTHROCYTE [SEDIMENTATION RATE] IN BLOOD BY WESTERGREN METHOD: 6 MM/HR (ref 0–15)
GASTROCULT GAST QL: POSITIVE
GFR SERPL CREATININE-BSD FRML MDRD: >90 ML/MIN/1.73M2
GLUCOSE BLDC GLUCOMTR-MCNC: 113 MG/DL (ref 70–99)
GLUCOSE BLDC GLUCOMTR-MCNC: 169 MG/DL (ref 70–99)
GLUCOSE BLDC GLUCOMTR-MCNC: 46 MG/DL (ref 70–99)
GLUCOSE SERPL-MCNC: 54 MG/DL (ref 70–99)
GLUCOSE UR STRIP-MCNC: NEGATIVE MG/DL
HCO3 BLDV-SCNC: 29 MMOL/L (ref 21–28)
HCT VFR BLD AUTO: 47.6 % (ref 40–53)
HGB BLD-MCNC: 16.3 G/DL (ref 13.3–17.7)
HGB UR QL STRIP: NEGATIVE
IMM GRANULOCYTES # BLD: 0 10E3/UL
IMM GRANULOCYTES NFR BLD: 0 %
KETONES UR STRIP-MCNC: 80 MG/DL
LEUKOCYTE ESTERASE UR QL STRIP: NEGATIVE
LIPASE SERPL-CCNC: 26 U/L (ref 13–60)
LYMPHOCYTES # BLD AUTO: 1.2 10E3/UL (ref 0.8–5.3)
LYMPHOCYTES NFR BLD AUTO: 10 %
MAGNESIUM SERPL-MCNC: 1.6 MG/DL (ref 1.7–2.3)
MCH RBC QN AUTO: 31.6 PG (ref 26.5–33)
MCHC RBC AUTO-ENTMCNC: 34.2 G/DL (ref 31.5–36.5)
MCV RBC AUTO: 92 FL (ref 78–100)
MONOCYTES # BLD AUTO: 0.4 10E3/UL (ref 0–1.3)
MONOCYTES NFR BLD AUTO: 3 %
MUCOUS THREADS #/AREA URNS LPF: PRESENT /LPF
NEUTROPHILS # BLD AUTO: 10.5 10E3/UL (ref 1.6–8.3)
NEUTROPHILS NFR BLD AUTO: 86 %
NITRATE UR QL: NEGATIVE
NRBC # BLD AUTO: 0 10E3/UL
NRBC BLD AUTO-RTO: 0 /100
O2/TOTAL GAS SETTING VFR VENT: 21 %
PCO2 BLDV: 44 MM HG (ref 40–50)
PH BLDV: 7.42 [PH] (ref 7.32–7.43)
PH GAST: ABNORMAL [PH]
PH UR STRIP: 8 [PH] (ref 5–7)
PLATELET # BLD AUTO: 305 10E3/UL (ref 150–450)
PO2 BLDV: 35 MM HG (ref 25–47)
POTASSIUM SERPL-SCNC: 4.2 MMOL/L (ref 3.4–5.3)
PROT SERPL-MCNC: 7.6 G/DL (ref 6.4–8.3)
RBC # BLD AUTO: 5.16 10E6/UL (ref 4.4–5.9)
RBC URINE: 0 /HPF
SODIUM SERPL-SCNC: 142 MMOL/L (ref 136–145)
SP GR UR STRIP: 1.03 (ref 1–1.03)
SQUAMOUS EPITHELIAL: <1 /HPF
UROBILINOGEN UR STRIP-MCNC: 4 MG/DL
WBC # BLD AUTO: 12.2 10E3/UL (ref 4–11)
WBC URINE: 0 /HPF

## 2023-06-29 PROCEDURE — 86140 C-REACTIVE PROTEIN: CPT | Performed by: PHYSICIAN ASSISTANT

## 2023-06-29 PROCEDURE — 85652 RBC SED RATE AUTOMATED: CPT | Performed by: PHYSICIAN ASSISTANT

## 2023-06-29 PROCEDURE — 82271 OCCULT BLOOD OTHER SOURCES: CPT | Performed by: PHYSICIAN ASSISTANT

## 2023-06-29 PROCEDURE — 82010 KETONE BODYS QUAN: CPT | Performed by: PHYSICIAN ASSISTANT

## 2023-06-29 PROCEDURE — 96361 HYDRATE IV INFUSION ADD-ON: CPT | Performed by: FAMILY MEDICINE

## 2023-06-29 PROCEDURE — 99285 EMERGENCY DEPT VISIT HI MDM: CPT | Mod: 25 | Performed by: FAMILY MEDICINE

## 2023-06-29 PROCEDURE — 82962 GLUCOSE BLOOD TEST: CPT

## 2023-06-29 PROCEDURE — C9113 INJ PANTOPRAZOLE SODIUM, VIA: HCPCS | Mod: JZ | Performed by: PHYSICIAN ASSISTANT

## 2023-06-29 PROCEDURE — G1010 CDSM STANSON: HCPCS

## 2023-06-29 PROCEDURE — 96375 TX/PRO/DX INJ NEW DRUG ADDON: CPT | Performed by: FAMILY MEDICINE

## 2023-06-29 PROCEDURE — 85025 COMPLETE CBC W/AUTO DIFF WBC: CPT | Performed by: PHYSICIAN ASSISTANT

## 2023-06-29 PROCEDURE — 36415 COLL VENOUS BLD VENIPUNCTURE: CPT | Performed by: PHYSICIAN ASSISTANT

## 2023-06-29 PROCEDURE — 96374 THER/PROPH/DIAG INJ IV PUSH: CPT | Mod: 59 | Performed by: FAMILY MEDICINE

## 2023-06-29 PROCEDURE — 81001 URINALYSIS AUTO W/SCOPE: CPT | Performed by: PHYSICIAN ASSISTANT

## 2023-06-29 PROCEDURE — 83690 ASSAY OF LIPASE: CPT | Performed by: PHYSICIAN ASSISTANT

## 2023-06-29 PROCEDURE — 258N000001 HC RX 258: Performed by: PHYSICIAN ASSISTANT

## 2023-06-29 PROCEDURE — 99285 EMERGENCY DEPT VISIT HI MDM: CPT | Performed by: FAMILY MEDICINE

## 2023-06-29 PROCEDURE — 96376 TX/PRO/DX INJ SAME DRUG ADON: CPT | Performed by: FAMILY MEDICINE

## 2023-06-29 PROCEDURE — 250N000011 HC RX IP 250 OP 636: Mod: JZ | Performed by: PHYSICIAN ASSISTANT

## 2023-06-29 PROCEDURE — 74177 CT ABD & PELVIS W/CONTRAST: CPT | Mod: MG

## 2023-06-29 PROCEDURE — 83735 ASSAY OF MAGNESIUM: CPT | Performed by: PHYSICIAN ASSISTANT

## 2023-06-29 PROCEDURE — 250N000011 HC RX IP 250 OP 636: Performed by: PHYSICIAN ASSISTANT

## 2023-06-29 PROCEDURE — 80053 COMPREHEN METABOLIC PANEL: CPT | Performed by: PHYSICIAN ASSISTANT

## 2023-06-29 PROCEDURE — 258N000003 HC RX IP 258 OP 636: Performed by: PHYSICIAN ASSISTANT

## 2023-06-29 PROCEDURE — 82803 BLOOD GASES ANY COMBINATION: CPT | Performed by: PHYSICIAN ASSISTANT

## 2023-06-29 PROCEDURE — 250N000009 HC RX 250: Performed by: PHYSICIAN ASSISTANT

## 2023-06-29 RX ORDER — DEXTROSE MONOHYDRATE 25 G/50ML
25-50 INJECTION, SOLUTION INTRAVENOUS
Status: DISCONTINUED | OUTPATIENT
Start: 2023-06-29 | End: 2023-06-30 | Stop reason: HOSPADM

## 2023-06-29 RX ORDER — HYDROMORPHONE HYDROCHLORIDE 1 MG/ML
0.5 INJECTION, SOLUTION INTRAMUSCULAR; INTRAVENOUS; SUBCUTANEOUS EVERY 30 MIN PRN
Status: DISCONTINUED | OUTPATIENT
Start: 2023-06-29 | End: 2023-06-30 | Stop reason: HOSPADM

## 2023-06-29 RX ORDER — DIPHENHYDRAMINE HYDROCHLORIDE 50 MG/ML
25 INJECTION INTRAMUSCULAR; INTRAVENOUS ONCE
Status: COMPLETED | OUTPATIENT
Start: 2023-06-29 | End: 2023-06-29

## 2023-06-29 RX ORDER — IOPAMIDOL 755 MG/ML
500 INJECTION, SOLUTION INTRAVASCULAR ONCE
Status: COMPLETED | OUTPATIENT
Start: 2023-06-29 | End: 2023-06-29

## 2023-06-29 RX ORDER — ONDANSETRON 2 MG/ML
4 INJECTION INTRAMUSCULAR; INTRAVENOUS EVERY 30 MIN PRN
Status: DISCONTINUED | OUTPATIENT
Start: 2023-06-29 | End: 2023-06-30 | Stop reason: HOSPADM

## 2023-06-29 RX ORDER — LORAZEPAM 2 MG/ML
1 INJECTION INTRAMUSCULAR ONCE
Status: COMPLETED | OUTPATIENT
Start: 2023-06-29 | End: 2023-06-29

## 2023-06-29 RX ORDER — ACETAMINOPHEN 500 MG
1000 TABLET ORAL EVERY 8 HOURS PRN
COMMUNITY

## 2023-06-29 RX ADMIN — PANTOPRAZOLE SODIUM 40 MG: 40 INJECTION, POWDER, FOR SOLUTION INTRAVENOUS at 20:21

## 2023-06-29 RX ADMIN — SODIUM CHLORIDE 60 ML: 9 INJECTION, SOLUTION INTRAVENOUS at 16:27

## 2023-06-29 RX ADMIN — HYDROMORPHONE HYDROCHLORIDE 0.5 MG: 1 INJECTION, SOLUTION INTRAMUSCULAR; INTRAVENOUS; SUBCUTANEOUS at 18:42

## 2023-06-29 RX ADMIN — ONDANSETRON 4 MG: 2 INJECTION INTRAMUSCULAR; INTRAVENOUS at 15:57

## 2023-06-29 RX ADMIN — SODIUM CHLORIDE 2000 ML: 9 INJECTION, SOLUTION INTRAVENOUS at 15:56

## 2023-06-29 RX ADMIN — PROCHLORPERAZINE EDISYLATE 10 MG: 5 INJECTION INTRAMUSCULAR; INTRAVENOUS at 18:42

## 2023-06-29 RX ADMIN — IOPAMIDOL 79 ML: 755 INJECTION, SOLUTION INTRAVENOUS at 16:28

## 2023-06-29 RX ADMIN — HYDROMORPHONE HYDROCHLORIDE 0.5 MG: 1 INJECTION, SOLUTION INTRAMUSCULAR; INTRAVENOUS; SUBCUTANEOUS at 20:21

## 2023-06-29 RX ADMIN — LORAZEPAM 1 MG: 2 INJECTION INTRAMUSCULAR; INTRAVENOUS at 15:59

## 2023-06-29 RX ADMIN — ONDANSETRON 4 MG: 2 INJECTION INTRAMUSCULAR; INTRAVENOUS at 17:30

## 2023-06-29 RX ADMIN — DIPHENHYDRAMINE HYDROCHLORIDE 25 MG: 50 INJECTION, SOLUTION INTRAMUSCULAR; INTRAVENOUS at 18:42

## 2023-06-29 RX ADMIN — DEXTROSE MONOHYDRATE 50 ML: 25 INJECTION, SOLUTION INTRAVENOUS at 17:08

## 2023-06-29 ASSESSMENT — ACTIVITIES OF DAILY LIVING (ADL)
ADLS_ACUITY_SCORE: 35
ADLS_ACUITY_SCORE: 33
ADLS_ACUITY_SCORE: 35
ADLS_ACUITY_SCORE: 35

## 2023-06-29 NOTE — MEDICATION SCRIBE - ADMISSION MEDICATION HISTORY
Medication Scribe Admission Medication History    Admission medication history is complete. The information provided in this note is only as accurate as the sources available at the time of the update.    Medication reconciliation/reorder completed by provider prior to medication history? No    Information Source(s): Patient via in-person    Pertinent Information: Patient reports he takes a short acting insulin based on a sliding scale but does not know which, unable to find any information from dispense report and outside source to verify. Patient has not had any today due to WNL blood sugar readings.     Changes made to PTA medication list:    Added: None    Deleted: None    Changed: None    Medication Affordability:  Not including over the counter (OTC) medications, was there a time in the past 3 months when you did not take your medications as prescribed because of cost?: No    Allergies reviewed with patient and updates made in EHR: yes    Medication History Completed By: KIM RIBEIRO 6/29/2023 4:48 PM    Prior to Admission medications    Medication Sig Last Dose Taking? Auth Provider Long Term End Date   acetaminophen (TYLENOL) 500 MG tablet Take 1,000 mg by mouth every 8 hours as needed for mild pain 6/29/2023 at am Yes Reported, Patient     atorvastatin (LIPITOR) 20 MG tablet Take 1 tablet (20 mg) by mouth daily 6/29/2023 at am Yes Nahun Clemente MD Yes    blood glucose (ONETOUCH VERIO IQ) test strip 1 strip by Other route 6/29/2023 at am Yes Reported, Patient     busPIRone (BUSPAR) 5 MG tablet Take 1 tablet (5 mg) by mouth 3 times daily 6/29/2023 at am Yes Nahun Clemente MD Yes    dicyclomine (BENTYL) 10 MG capsule Take 1 capsule (10 mg) by mouth 4 times daily as needed (prior to eating) Past Week Yes Nahun Clemente MD     DULoxetine (CYMBALTA) 20 MG capsule Take 1 capsule (20 mg) by mouth daily 6/29/2023 at am Yes Nahun Clemente MD Yes    gabapentin (NEURONTIN) 300 MG capsule  Take 1 capsule (300 mg) by mouth 3 times daily 6/29/2023 at am Yes Nahun Clemente MD Yes    insulin NPH (NOVOLIN N VIAL) 100 UNIT/ML vial Inject 25 Units Subcutaneous 2 times daily 6/29/2023 at am Yes Nahun Clemente MD Yes    lisinopril (ZESTRIL) 10 MG tablet Take 1 tablet (10 mg) by mouth daily 6/29/2023 at am Yes Nahun Clemente MD Yes    metoclopramide (REGLAN) 5 MG tablet Take 1 tablet (5 mg) by mouth 3 times daily as needed (with meals as needed) 6/29/2023 at am Yes Nahun Clemente MD     omeprazole (PRILOSEC) 20 MG DR capsule Take 1 capsule (20 mg) by mouth 2 times daily as needed (heartburn) 6/29/2023 at am Yes Nahun Clemente MD     ondansetron (ZOFRAN ODT) 8 MG ODT tab Dissolve 1 tablet in mouth every 8 to 12 hours as needed for nausea or vomiting 6/29/2023 at am Yes Nahun Clemente MD     blood glucose (ONETOUCH VERIO IQ) test strip Use every morning as directed to measure blood sugar   Reported, Patient     omeprazole (PRILOSEC) 40 MG DR capsule Take 1 capsule (40 mg) by mouth daily  at not started  Nahun Clemente MD     prochlorperazine (COMPAZINE) 10 MG tablet Take 1 tablet (10 mg) by mouth every 6 hours as needed for nausea or vomiting  at not started  Jed Fam MD

## 2023-06-29 NOTE — ED TRIAGE NOTES
Reports abdominal pain and N/V since he returned home from last ED visit.      Triage Assessment     Row Name 06/29/23 0733       Triage Assessment (Adult)    Airway WDL WDL       Respiratory WDL    Respiratory WDL WDL       Skin Circulation/Temperature WDL    Skin Circulation/Temperature WDL WDL       Cardiac WDL    Cardiac WDL WDL       Peripheral/Neurovascular WDL    Peripheral Neurovascular WDL WDL       Cognitive/Neuro/Behavioral WDL    Cognitive/Neuro/Behavioral WDL WDL

## 2023-06-29 NOTE — ED PROVIDER NOTES
History     Chief Complaint   Patient presents with     Abdominal Pain     HPI  Elias Garcia is a 37 year old male who presents for evaluation of increasing abdominal pain, nausea, vomiting, and generalized body shaking.  States that he has not been able to keep anything p.o. down.  He is worse since his last ED visit 2 days ago.  Reports the pain is primarily across the upper abdomen rated 8 on a scale of 10 and described as a dull aching constant discomfort.  He has attempted home treatment with Zofran and Tylenol without improvement.  Notes chills and sweats but no fever.  Blood sugars have been in the 100-110 range.  No significant highs or lows.  He thinks that he had a bowel movement today and states that it was normal.  No hematochezia or melena.  Patient has a history of insulin dependent diabetes.  Denies any alcohol use ever since he was 1 years old.  He does admit to smoking marijuana about 2 times per week.  Has never had an issue with cyclical vomiting syndrome.  Does have a known history of chronic pancreatitis.  Denies any dysuria, frequency, urgency, or gross hematuria.    Allergies:  Allergies   Allergen Reactions     Sulfa Antibiotics Hives       Problem List:    Patient Active Problem List    Diagnosis Date Noted     Right inguinal hernia 10/02/2018     Priority: Medium     Anxiety 09/04/2018     Priority: Medium     Major depressive disorder, single episode, in partial remission (H) 06/05/2017     Priority: Medium     Hx of cholecystectomy 01/04/2016     Priority: Medium     Formatting of this note might be different from the original.  Pancreatitis 2/2013       Pseudocyst, pancreas 03/19/2013     Priority: Medium     HTN (hypertension) 03/12/2013     Priority: Medium        Past Medical History:    History reviewed. No pertinent past medical history.    Past Surgical History:    Past Surgical History:   Procedure Laterality Date     CHOLECYSTECTOMY         Family History:    No family history  "on file.    Social History:  Marital Status:  Single [1]  Social History     Tobacco Use     Smoking status: Some Days     Types: Cigarettes, Vaping Device     Last attempt to quit: 2022     Years since quittin.8     Smokeless tobacco: Never   Vaping Use     Vaping Use: Some days     Substances: Nicotine     Devices: Disposable   Substance Use Topics     Alcohol use: Not Currently        Medications:    acetaminophen (TYLENOL) 500 MG tablet  atorvastatin (LIPITOR) 20 MG tablet  blood glucose (ONETOUCH VERIO IQ) test strip  busPIRone (BUSPAR) 5 MG tablet  dicyclomine (BENTYL) 10 MG capsule  DULoxetine (CYMBALTA) 20 MG capsule  gabapentin (NEURONTIN) 300 MG capsule  insulin NPH (NOVOLIN N VIAL) 100 UNIT/ML vial  lisinopril (ZESTRIL) 10 MG tablet  metoclopramide (REGLAN) 5 MG tablet  omeprazole (PRILOSEC) 20 MG DR capsule  ondansetron (ZOFRAN ODT) 8 MG ODT tab  blood glucose (ONETOUCH VERIO IQ) test strip  omeprazole (PRILOSEC) 40 MG DR capsule  prochlorperazine (COMPAZINE) 10 MG tablet          Review of Systems   All other systems reviewed and are negative.      Physical Exam   BP: (!) 160/104  Pulse: 91  Temp: 98.2  F (36.8  C)  Resp: 20  Height: 167.6 cm (5' 6\")  Weight: 73 kg (161 lb)  SpO2: 100 %      Physical Exam  Vitals and nursing note reviewed.   Constitutional:       General: He is not in acute distress.     Appearance: He is not ill-appearing or diaphoretic.      Comments: Holding an emesis bag.  Recurrent hiccups.   HENT:      Head: Normocephalic and atraumatic.      Right Ear: External ear normal.      Left Ear: External ear normal.      Nose: Nose normal.      Mouth/Throat:      Pharynx: No pharyngeal swelling or oropharyngeal exudate.      Comments: Dry  Eyes:      General: No scleral icterus.        Right eye: No discharge.         Left eye: No discharge.      Conjunctiva/sclera: Conjunctivae normal.      Pupils: Pupils are equal, round, and reactive to light.   Neck:      Thyroid: No " thyromegaly.   Cardiovascular:      Rate and Rhythm: Normal rate and regular rhythm.      Heart sounds: Normal heart sounds. No murmur heard.  Pulmonary:      Effort: Pulmonary effort is normal. No respiratory distress.      Breath sounds: Normal breath sounds. No wheezing or rales.   Chest:      Chest wall: No tenderness.   Abdominal:      General: Bowel sounds are normal. There is no distension.      Palpations: Abdomen is soft. There is no hepatomegaly, splenomegaly or mass.      Tenderness: There is abdominal tenderness (Across the upper abdomen.  Seems to be more in the epigastric area.). There is no right CVA tenderness, left CVA tenderness, guarding or rebound. Negative signs include Joseph's sign and Rovsing's sign.      Hernia: There is no hernia in the umbilical area or ventral area.   Musculoskeletal:         General: No tenderness or deformity. Normal range of motion.      Cervical back: Normal range of motion and neck supple.   Lymphadenopathy:      Cervical: No cervical adenopathy.   Skin:     General: Skin is warm and dry.      Capillary Refill: Capillary refill takes less than 2 seconds.      Findings: No erythema or rash.   Neurological:      Mental Status: He is alert and oriented to person, place, and time.      Cranial Nerves: No cranial nerve deficit.   Psychiatric:         Behavior: Behavior normal.         Thought Content: Thought content normal.         ED Course          6:14 PM -I reevaluated the patient.  He is still having significant regular hiccups.  Just experienced large-volume dark emesis 10 minutes ago.  We will test an occult.  Pain is rated 5 on a scale of 10.  He has received Zofran, IV normal saline, and Ativan to this point.  We will transition to Benadryl and Compazine for the nausea.  No sign of obstruction on his CT.  Chronic pancreatitis changes noted.  We will also add Dilaudid for pain management.    7:15 PM -gastric occult is positive.  Patient continues to be nauseated.   His pain is still significant.  Therefore, he would not be able to return home.  He may need an EGD.  Given his GI bleed with vomiting, he will need transfer.  We discussed this with the patient.  He wants to go to [facility], as that is close to his home.  Therefore, we will reach out to them to see if they have any bed availability.  Patient is vitally stable currently.  No hypotension or tachycardia.  He has only had the one dark episode of emesis here.  A dose of IV Protonix will be initiated.       Procedures              Critical Care time:  none               Results for orders placed or performed during the hospital encounter of 06/29/23 (from the past 24 hour(s))   CBC with platelets differential    Narrative    The following orders were created for panel order CBC with platelets differential.  Procedure                               Abnormality         Status                     ---------                               -----------         ------                     CBC with platelets and d...[[id number]]  Abnormal            Final result                 Please view results for these tests on the individual orders.   Comprehensive metabolic panel   Result Value Ref Range    Sodium 142 136 - 145 mmol/L    Potassium 4.2 3.4 - 5.3 mmol/L    Chloride 106 98 - 107 mmol/L    Carbon Dioxide (CO2) 23 22 - 29 mmol/L    Anion Gap 13 7 - 15 mmol/L    Urea Nitrogen 18.2 6.0 - 20.0 mg/dL    Creatinine 0.69 0.67 - 1.17 mg/dL    Calcium 9.7 8.6 - 10.0 mg/dL    Glucose 54 (L) 70 - 99 mg/dL    Alkaline Phosphatase 71 40 - 129 U/L    AST 28 0 - 45 U/L    ALT 33 0 - 70 U/L    Protein Total 7.6 6.4 - 8.3 g/dL    Albumin 4.5 3.5 - 5.2 g/dL    Bilirubin Total 1.1 <=1.2 mg/dL    GFR Estimate >90 >60 mL/min/1.73m2   Lipase   Result Value Ref Range    Lipase 26 13 - 60 U/L   Ketone Beta-Hydroxybutyrate Quantitative   Result Value Ref Range    Ketone (Beta-Hydroxybutyrate) Quantitative 0.70 (H) <=0.30 mmol/L   Magnesium    Result Value Ref Range    Magnesium 1.6 (L) 1.7 - 2.3 mg/dL   CRP inflammation   Result Value Ref Range    CRP Inflammation <3.00 <5.00 mg/L   Erythrocyte sedimentation rate auto   Result Value Ref Range    Erythrocyte Sedimentation Rate 6 0 - 15 mm/hr   Blood gas venous   Result Value Ref Range    pH Venous 7.42 7.32 - 7.43    pCO2 Venous 44 40 - 50 mm Hg    pO2 Venous 35 25 - 47 mm Hg    Bicarbonate Venous 29 (H) 21 - 28 mmol/L    Base Excess/Deficit (+/-) 3.4 (H) -7.7 - 1.9 mmol/L    FIO2 21    CBC with platelets and differential   Result Value Ref Range    WBC Count 12.2 (H) 4.0 - 11.0 10e3/uL    RBC Count 5.16 4.40 - 5.90 10e6/uL    Hemoglobin 16.3 13.3 - 17.7 g/dL    Hematocrit 47.6 40.0 - 53.0 %    MCV 92 78 - 100 fL    MCH 31.6 26.5 - 33.0 pg    MCHC 34.2 31.5 - 36.5 g/dL    RDW 12.6 10.0 - 15.0 %    Platelet Count 305 150 - 450 10e3/uL    % Neutrophils 86 %    % Lymphocytes 10 %    % Monocytes 3 %    % Eosinophils 0 %    % Basophils 1 %    % Immature Granulocytes 0 %    NRBCs per 100 WBC 0 <1 /100    Absolute Neutrophils 10.5 (H) 1.6 - 8.3 10e3/uL    Absolute Lymphocytes 1.2 0.8 - 5.3 10e3/uL    Absolute Monocytes 0.4 0.0 - 1.3 10e3/uL    Absolute Eosinophils 0.0 0.0 - 0.7 10e3/uL    Absolute Basophils 0.1 0.0 - 0.2 10e3/uL    Absolute Immature Granulocytes 0.0 <=0.4 10e3/uL    Absolute NRBCs 0.0 10e3/uL   CT Abdomen Pelvis w Contrast    Narrative    EXAM: CT ABDOMEN PELVIS W CONTRAST  LOCATION: [facility]  DATE: 6/29/2023    INDICATION: severe upper abdominal pain  COMPARISON: None.  TECHNIQUE: CT scan of the abdomen and pelvis was performed following injection of IV contrast. Multiplanar reformats were obtained. Dose reduction techniques were used.  CONTRAST: Isovue 370, 79mL    FINDINGS:   LOWER CHEST: Normal.    HEPATOBILIARY: Cholecystectomy with reservoir effect of the bile ducts. Geographic focal steatosis along the falciform ligament and gallbladder  fossa.    PANCREAS: Diffuse parenchymal atrophy with scattered parenchymal calcifications consistent with chronic pancreatitis. There is ill-defined soft tissue thickening and ductal dilation or cystic lesions in the distal body and tail. No peripancreatic fat   stranding.    SPLEEN: Normal.    ADRENAL GLANDS: Normal.    KIDNEYS/BLADDER: Normal.    BOWEL: No obstruction or inflammatory change. Short segment small bowel intussusception in the left midabdomen, likely transient.    LYMPH NODES: Normal.    VASCULATURE: Chronically occluded splenic vein with extensive perigastric collateral formation. The main portal vein is mildly narrowed in the region of the pancreatic head, but patent.    PELVIC ORGANS: Normal.    MUSCULOSKELETAL: Normal.      Impression    IMPRESSION:   1.  Pancreatic parenchymal atrophy and calcifications consistent with chronic pancreatitis. Low-attenuation areas in the distal body and tail could be dilated pancreatic duct, cystic lesions, or fluid collections. Definitive characterization is difficult   without comparison imaging (not available time of dictation).    2.  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   Glucose by meter   Result Value Ref Range    GLUCOSE BY METER POCT 46 (LL) 70 - 99 mg/dL   UA with Microscopic reflex to Culture    Specimen: Urine, NOS   Result Value Ref Range    Color Urine Yellow Colorless, Straw, Light Yellow, Yellow    Appearance Urine Slightly Cloudy (A) Clear    Glucose Urine Negative Negative mg/dL    Bilirubin Urine Negative Negative    Ketones Urine 80 (A) Negative mg/dL    Specific Gravity Urine 1.035 1.003 - 1.035    Blood Urine Negative Negative    pH Urine 8.0 (H) 5.0 - 7.0    Protein Albumin Urine Negative Negative mg/dL    Urobilinogen Urine 4.0 (A) Normal, 2.0 mg/dL    Nitrite Urine Negative Negative    Leukocyte Esterase Urine Negative Negative    Mucus Urine Present (A) None Seen /LPF    RBC Urine 0 <=2 /HPF    WBC Urine 0  <=5 /HPF    Squamous Epithelials Urine <1 <=1 /HPF    Narrative    Urine Culture not indicated   Glucose by meter   Result Value Ref Range    GLUCOSE BY METER POCT 169 (H) 70 - 99 mg/dL   Occult blood gastric   Result Value Ref Range    Occult Blood Gastric Positive (A) Negative    pH Gastric 3 pH    Glucose by meter   Result Value Ref Range    GLUCOSE BY METER POCT 113 (H) 70 - 99 mg/dL       Medications   ondansetron (ZOFRAN) injection 4 mg (4 mg Intravenous $Given 6/29/23 1730)   dextrose 50 % injection 25-50 mL (50 mLs Intravenous $Given 6/29/23 1708)   HYDROmorphone (PF) (DILAUDID) injection 0.5 mg (0.5 mg Intravenous $Given 6/29/23 2021)   0.9% sodium chloride BOLUS (0 mLs Intravenous Stopped 6/29/23 1707)   LORazepam (ATIVAN) injection 1 mg (1 mg Intravenous $Given 6/29/23 1559)   iopamidol (ISOVUE-370) solution 500 mL (79 mLs Intravenous $Given 6/29/23 1628)   sodium chloride 0.9 % bag 100mL for CT scan flush use (60 mLs Intravenous $Given 6/29/23 1627)   diphenhydrAMINE (BENADRYL) injection 25 mg (25 mg Intravenous $Given 6/29/23 1842)   prochlorperazine (COMPAZINE) injection 10 mg (10 mg Intravenous $Given 6/29/23 1842)   pantoprazole (PROTONIX) IV push injection 40 mg (40 mg Intravenous $Given 6/29/23 2021)       Assessments & Plan (with Medical Decision Making)     Upper GI bleed  Nausea and vomiting  Abdominal pain, epigastric  H/O chronic pancreatitis     37 year old male with a history of chronic pancreatitis who presents for evaluation of worsening symptoms.  Was seen in the ED 2 days ago.  States that he has not been able to keep any food or fluids down since then.  Ongoing nausea, vomiting, generalized body shaking, and upper abdominal pain rated 8 on a scale of 10 and described as dull and aching.  Chills and sweats but no fever.  Has not had alcohol for over 9 years per his report.  Does admit to smoking marijuana 2 times per week.  No prior history of cyclical vomiting syndrome.  See HPI above  for details.  On exam blood pressure 160/104, temperature 98.2, pulse 91, respiration 20, oxygen saturation 100% on room air.  Patient is holding an emesis bag.  Spitting into it.  Recurrent hiccups.  Appears to be uncomfortable.  Cardiopulmonary exam normal.  Oropharynx is dry.  Has upper abdominal discomfort most notably in the epigastric area without rebound or guarding.  No hepatosplenomegaly.  Good bowel sounds.  No other exam abnormalities noted.  IV was established.  He was given 2 L of IV normal saline, IV Zofran, and IV Ativan initially for symptom management.  Nausea improved but did not resolve.  Pain continued and radiated now 5 on a scale of 10.  Please see ED course notes above for details.  Added on Dilaudid, Benadryl, Compazine.  Laboratory levels with a stable hemoglobin at 16.3.  White blood cell, mild elevation of 12,200 with left shift.  Venous blood gas without acidosis.  He is mildly ketotic with a ketone level of 0.70.  Probably more related to starvation ketosis.  Patient has not had hyperglycemia.  He is actually hypoglycemic here with a blood sugar of 54.  He was given dextrose for this.  Comprehensive metabolic panel otherwise normal with normal renal and hepatic function.  Lipase stable at 26.  CRP and ESR negative.  CT of the abdomen/pelvis with pancreatic parenchymal atrophy and calcifications consistent with chronic pancreatitis.  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  No infectious etiology identified.  No sign of obstruction.  Urinalysis negative for nitrite or leukocyte esterase.  Despite Ativan, Zofran, Dilaudid, Benadryl, and Compazine, the patient was still quite nauseated.  Still having pain.  4 hours into his ED visit, he did have a occult positive episode of emesis. A dose of IV protonix was given.  Unfortunately, we do not have any GI coverage at our hospital.  I inquired with the patient about where he would prefer to be transferred.  He  requested [facility].  We contacted [facility].  They did not have any available inpatient beds.  Unfortunately, the whole [facility] system does not have any open beds.  I spoke with, Dr. Calderón, inpatient hospitalist at [facility].  He kindly agreed to accept care of this patient.  We discussed the patient case in detail and he did not recommend any acute change to his regimen currently especially given that his vital signs are stable.  No evidence for hypotension or tachycardia.  Therefore, I have not initiated any blood products.  Patient has only had 1 episode of hematochezia here in the emergency department.  Dr. Mckeon, ED MD, was involved with this patient's care as well.     I have reviewed the nursing notes.    I have reviewed the findings, diagnosis, plan and need for follow up with the patient.           Medical Decision Making  The patient's presentation was of high complexity (a chronic illness severe exacerbation, progression, or side effect of treatment).    The patient's evaluation involved:  ordering and/or review of 3+ test(s) in this encounter (see separate area of note for details)    The patient's management necessitated high risk (a parenteral controlled substance), high risk (drug therapy requiring intensive monitoring (see separate area of note for details)) and high risk (a decision regarding hospitalization).        New Prescriptions    No medications on file       Final diagnoses:   Upper GI bleed   Nausea and vomiting   Abdominal pain, epigastric   H/O chronic pancreatitis         Disclaimer: This note consists of symbols derived from keyboarding, dictation and/or voice recognition software. As a result, there may be errors in the script that have gone undetected. Please consider this when interpreting information found in this chart.    6/29/2023   [facility] EMERGENCY DEPT     Nain Thomas PA-C  06/29/23 [id number]

## 2023-06-30 ENCOUNTER — TELEPHONE (OUTPATIENT)
Dept: FAMILY MEDICINE | Facility: CLINIC | Age: 38
End: 2023-06-30

## 2023-06-30 NOTE — TELEPHONE ENCOUNTER
Prior Authorization Retail Medication Request    Medication/Dose: omeprazole (PRILOSEC) 40 MG DR capsule  ICD code (if different than what is on RX):  Heartburn [R12]  - Primary   Previously Tried and Failed:  na  Rationale:  na    Insurance Name:  MEDICARE  Insurance ID:  [id number]      Pharmacy Information (if different than what is on RX)  Name:  [facility]  Phone:  [phone]

## 2023-07-05 ENCOUNTER — HOSPITAL ENCOUNTER (INPATIENT)
Facility: CLINIC | Age: 38
LOS: 4 days | Discharge: HOME | DRG: 917 | End: 2023-07-10
Attending: FAMILY MEDICINE | Admitting: INTERNAL MEDICINE
Payer: MEDICARE

## 2023-07-05 ENCOUNTER — APPOINTMENT (OUTPATIENT)
Dept: GENERAL RADIOLOGY | Facility: CLINIC | Age: 38
DRG: 917 | End: 2023-07-05
Attending: FAMILY MEDICINE
Payer: MEDICARE

## 2023-07-05 DIAGNOSIS — R55 SYNCOPE, UNSPECIFIED SYNCOPE TYPE: ICD-10-CM

## 2023-07-05 DIAGNOSIS — R79.89 ELEVATED LACTIC ACID LEVEL: ICD-10-CM

## 2023-07-05 DIAGNOSIS — R55 SYNCOPE AND COLLAPSE: ICD-10-CM

## 2023-07-05 DIAGNOSIS — F41.9 ANXIETY: ICD-10-CM

## 2023-07-05 DIAGNOSIS — E86.0 DEHYDRATION: ICD-10-CM

## 2023-07-05 DIAGNOSIS — E11.9 TYPE 2 DIABETES MELLITUS WITHOUT COMPLICATION, WITH LONG-TERM CURRENT USE OF INSULIN (H): ICD-10-CM

## 2023-07-05 DIAGNOSIS — E87.6 HYPOKALEMIA: ICD-10-CM

## 2023-07-05 DIAGNOSIS — R11.2 NAUSEA AND VOMITING, UNSPECIFIED VOMITING TYPE: ICD-10-CM

## 2023-07-05 DIAGNOSIS — K86.1 CHRONIC PANCREATITIS, UNSPECIFIED PANCREATITIS TYPE (H): Primary | ICD-10-CM

## 2023-07-05 DIAGNOSIS — I95.9 HYPOTENSION, UNSPECIFIED HYPOTENSION TYPE: ICD-10-CM

## 2023-07-05 DIAGNOSIS — K29.90 GASTRODUODENITIS WITHOUT BLEEDING: ICD-10-CM

## 2023-07-05 DIAGNOSIS — N17.9 ACUTE RENAL FAILURE, UNSPECIFIED ACUTE RENAL FAILURE TYPE (H): ICD-10-CM

## 2023-07-05 DIAGNOSIS — Z79.4 TYPE 2 DIABETES MELLITUS WITHOUT COMPLICATION, WITH LONG-TERM CURRENT USE OF INSULIN (H): ICD-10-CM

## 2023-07-05 LAB
ALBUMIN SERPL BCG-MCNC: 4.2 G/DL (ref 3.5–5.2)
ALP SERPL-CCNC: 61 U/L (ref 40–129)
ALT SERPL W P-5'-P-CCNC: 26 U/L (ref 0–70)
ANION GAP SERPL CALCULATED.3IONS-SCNC: 12 MMOL/L (ref 7–15)
AST SERPL W P-5'-P-CCNC: 23 U/L (ref 0–45)
BASE EXCESS BLDV CALC-SCNC: 3.7 MMOL/L (ref -7.7–1.9)
BASOPHILS # BLD AUTO: 0.1 10E3/UL (ref 0–0.2)
BASOPHILS NFR BLD AUTO: 1 %
BILIRUB SERPL-MCNC: 0.7 MG/DL
BUN SERPL-MCNC: 24.6 MG/DL (ref 6–20)
CALCIUM SERPL-MCNC: 9.5 MG/DL (ref 8.6–10)
CHLORIDE SERPL-SCNC: 101 MMOL/L (ref 98–107)
CREAT SERPL-MCNC: 1.38 MG/DL (ref 0.67–1.17)
DEPRECATED HCO3 PLAS-SCNC: 28 MMOL/L (ref 22–29)
EOSINOPHIL # BLD AUTO: 0.2 10E3/UL (ref 0–0.7)
EOSINOPHIL NFR BLD AUTO: 2 %
ERYTHROCYTE [DISTWIDTH] IN BLOOD BY AUTOMATED COUNT: 12.8 % (ref 10–15)
GFR SERPL CREATININE-BSD FRML MDRD: 67 ML/MIN/1.73M2
GLUCOSE BLDC GLUCOMTR-MCNC: 89 MG/DL (ref 70–99)
GLUCOSE SERPL-MCNC: 125 MG/DL (ref 70–99)
HCO3 BLDV-SCNC: 31 MMOL/L (ref 21–28)
HCT VFR BLD AUTO: 43 % (ref 40–53)
HGB BLD-MCNC: 14.9 G/DL (ref 13.3–17.7)
HOLD SPECIMEN: NORMAL
IMM GRANULOCYTES # BLD: 0 10E3/UL
IMM GRANULOCYTES NFR BLD: 0 %
LACTATE SERPL-SCNC: 2.3 MMOL/L (ref 0.7–2)
LIPASE SERPL-CCNC: 108 U/L (ref 13–60)
LYMPHOCYTES # BLD AUTO: 4.6 10E3/UL (ref 0.8–5.3)
LYMPHOCYTES NFR BLD AUTO: 42 %
MCH RBC QN AUTO: 31.9 PG (ref 26.5–33)
MCHC RBC AUTO-ENTMCNC: 34.7 G/DL (ref 31.5–36.5)
MCV RBC AUTO: 92 FL (ref 78–100)
MONOCYTES # BLD AUTO: 0.8 10E3/UL (ref 0–1.3)
MONOCYTES NFR BLD AUTO: 7 %
NEUTROPHILS # BLD AUTO: 5.4 10E3/UL (ref 1.6–8.3)
NEUTROPHILS NFR BLD AUTO: 48 %
NRBC # BLD AUTO: 0 10E3/UL
NRBC BLD AUTO-RTO: 0 /100
O2/TOTAL GAS SETTING VFR VENT: 21 %
PCO2 BLDV: 56 MM HG (ref 40–50)
PH BLDV: 7.36 [PH] (ref 7.32–7.43)
PLATELET # BLD AUTO: 369 10E3/UL (ref 150–450)
PO2 BLDV: 30 MM HG (ref 25–47)
POTASSIUM SERPL-SCNC: 3.3 MMOL/L (ref 3.4–5.3)
PROT SERPL-MCNC: 6.9 G/DL (ref 6.4–8.3)
RBC # BLD AUTO: 4.67 10E6/UL (ref 4.4–5.9)
SODIUM SERPL-SCNC: 141 MMOL/L (ref 136–145)
TROPONIN T SERPL HS-MCNC: 11 NG/L
WBC # BLD AUTO: 11.2 10E3/UL (ref 4–11)

## 2023-07-05 PROCEDURE — 84145 PROCALCITONIN (PCT): CPT | Performed by: FAMILY MEDICINE

## 2023-07-05 PROCEDURE — 96365 THER/PROPH/DIAG IV INF INIT: CPT | Performed by: FAMILY MEDICINE

## 2023-07-05 PROCEDURE — 99285 EMERGENCY DEPT VISIT HI MDM: CPT | Mod: 25 | Performed by: FAMILY MEDICINE

## 2023-07-05 PROCEDURE — 93005 ELECTROCARDIOGRAM TRACING: CPT | Performed by: FAMILY MEDICINE

## 2023-07-05 PROCEDURE — 36415 COLL VENOUS BLD VENIPUNCTURE: CPT | Performed by: FAMILY MEDICINE

## 2023-07-05 PROCEDURE — 93010 ELECTROCARDIOGRAM REPORT: CPT | Performed by: FAMILY MEDICINE

## 2023-07-05 PROCEDURE — 85025 COMPLETE CBC W/AUTO DIFF WBC: CPT | Performed by: FAMILY MEDICINE

## 2023-07-05 PROCEDURE — 96361 HYDRATE IV INFUSION ADD-ON: CPT | Performed by: FAMILY MEDICINE

## 2023-07-05 PROCEDURE — 250N000011 HC RX IP 250 OP 636: Mod: JZ | Performed by: FAMILY MEDICINE

## 2023-07-05 PROCEDURE — 71045 X-RAY EXAM CHEST 1 VIEW: CPT

## 2023-07-05 PROCEDURE — 96367 TX/PROPH/DG ADDL SEQ IV INF: CPT | Performed by: FAMILY MEDICINE

## 2023-07-05 PROCEDURE — 83605 ASSAY OF LACTIC ACID: CPT | Performed by: FAMILY MEDICINE

## 2023-07-05 PROCEDURE — 84484 ASSAY OF TROPONIN QUANT: CPT | Performed by: FAMILY MEDICINE

## 2023-07-05 PROCEDURE — 87040 BLOOD CULTURE FOR BACTERIA: CPT | Performed by: FAMILY MEDICINE

## 2023-07-05 PROCEDURE — 80053 COMPREHEN METABOLIC PANEL: CPT | Performed by: FAMILY MEDICINE

## 2023-07-05 PROCEDURE — 82803 BLOOD GASES ANY COMBINATION: CPT | Performed by: FAMILY MEDICINE

## 2023-07-05 PROCEDURE — 258N000003 HC RX IP 258 OP 636: Performed by: PHYSICIAN ASSISTANT

## 2023-07-05 PROCEDURE — 82550 ASSAY OF CK (CPK): CPT | Performed by: FAMILY MEDICINE

## 2023-07-05 PROCEDURE — 87637 SARSCOV2&INF A&B&RSV AMP PRB: CPT | Performed by: FAMILY MEDICINE

## 2023-07-05 PROCEDURE — 82962 GLUCOSE BLOOD TEST: CPT

## 2023-07-05 PROCEDURE — 83690 ASSAY OF LIPASE: CPT | Performed by: FAMILY MEDICINE

## 2023-07-05 PROCEDURE — 258N000003 HC RX IP 258 OP 636: Performed by: FAMILY MEDICINE

## 2023-07-05 RX ORDER — SODIUM CHLORIDE, SODIUM LACTATE, POTASSIUM CHLORIDE, CALCIUM CHLORIDE 600; 310; 30; 20 MG/100ML; MG/100ML; MG/100ML; MG/100ML
INJECTION, SOLUTION INTRAVENOUS CONTINUOUS
Status: DISCONTINUED | OUTPATIENT
Start: 2023-07-05 | End: 2023-07-06

## 2023-07-05 RX ORDER — PIPERACILLIN SODIUM, TAZOBACTAM SODIUM 4; .5 G/20ML; G/20ML
4.5 INJECTION, POWDER, LYOPHILIZED, FOR SOLUTION INTRAVENOUS ONCE
Status: COMPLETED | OUTPATIENT
Start: 2023-07-05 | End: 2023-07-06

## 2023-07-05 RX ADMIN — SODIUM CHLORIDE 1000 ML: 9 INJECTION, SOLUTION INTRAVENOUS at 23:06

## 2023-07-05 RX ADMIN — SODIUM CHLORIDE 1000 ML: 9 INJECTION, SOLUTION INTRAVENOUS at 21:34

## 2023-07-05 RX ADMIN — PIPERACILLIN AND TAZOBACTAM 4.5 G: 4; .5 INJECTION, POWDER, FOR SOLUTION INTRAVENOUS at 23:58

## 2023-07-05 RX ADMIN — SODIUM CHLORIDE 1000 ML: 9 INJECTION, SOLUTION INTRAVENOUS at 22:30

## 2023-07-05 ASSESSMENT — ENCOUNTER SYMPTOMS
CHEST TIGHTNESS: 0
SHORTNESS OF BREATH: 0

## 2023-07-05 ASSESSMENT — ACTIVITIES OF DAILY LIVING (ADL): ADLS_ACUITY_SCORE: 35

## 2023-07-05 NOTE — TELEPHONE ENCOUNTER
Central Prior Authorization Team - Phone: [phone]     PA Initiation    Medication: OMEPRAZOLE 40 MG PO CPDR  Insurance Company: [employer] Part D - Phone [phone] Fax [phone]  Pharmacy Filling the Rx: [facility] [address] [place] [address] - [address]  Filling Pharmacy Phone: [phone]  Filling Pharmacy Fax:    Start Date: 7/5/2023

## 2023-07-06 ENCOUNTER — APPOINTMENT (OUTPATIENT)
Dept: GENERAL RADIOLOGY | Facility: CLINIC | Age: 38
DRG: 917 | End: 2023-07-06
Attending: FAMILY MEDICINE
Payer: MEDICARE

## 2023-07-06 PROBLEM — I95.9 HYPOTENSION, UNSPECIFIED HYPOTENSION TYPE: Status: ACTIVE | Noted: 2023-07-06

## 2023-07-06 PROBLEM — K86.1 CHRONIC PANCREATITIS (H): Status: ACTIVE | Noted: 2023-07-06

## 2023-07-06 PROBLEM — R79.89 ELEVATED LACTIC ACID LEVEL: Status: ACTIVE | Noted: 2023-07-06

## 2023-07-06 PROBLEM — Z79.4 TYPE 2 DIABETES MELLITUS WITHOUT COMPLICATION, WITH LONG-TERM CURRENT USE OF INSULIN (H): Status: ACTIVE | Noted: 2023-07-06

## 2023-07-06 PROBLEM — T43.595A: Status: ACTIVE | Noted: 2023-07-06

## 2023-07-06 PROBLEM — N17.9 ACUTE RENAL FAILURE, UNSPECIFIED ACUTE RENAL FAILURE TYPE (H): Status: ACTIVE | Noted: 2023-07-06

## 2023-07-06 PROBLEM — E83.39 HYPOPHOSPHATEMIA: Status: ACTIVE | Noted: 2023-07-06

## 2023-07-06 PROBLEM — E87.6 HYPOKALEMIA: Status: ACTIVE | Noted: 2023-07-06

## 2023-07-06 PROBLEM — R55 SYNCOPE AND COLLAPSE: Status: ACTIVE | Noted: 2023-07-06

## 2023-07-06 PROBLEM — R57.1 HYPOVOLEMIC SHOCK (H): Status: ACTIVE | Noted: 2023-07-06

## 2023-07-06 PROBLEM — R11.2 NAUSEA AND VOMITING, UNSPECIFIED VOMITING TYPE: Status: ACTIVE | Noted: 2023-07-06

## 2023-07-06 PROBLEM — E11.9 TYPE 2 DIABETES MELLITUS WITHOUT COMPLICATION, WITH LONG-TERM CURRENT USE OF INSULIN (H): Status: ACTIVE | Noted: 2023-07-06

## 2023-07-06 PROBLEM — E86.0 DEHYDRATION: Status: ACTIVE | Noted: 2023-07-06

## 2023-07-06 LAB
ALBUMIN UR-MCNC: 30 MG/DL
AMPHETAMINES UR QL: NOT DETECTED
ANION GAP SERPL CALCULATED.3IONS-SCNC: 14 MMOL/L (ref 7–15)
APPEARANCE UR: ABNORMAL
BARBITURATES UR QL SCN: NOT DETECTED
BASOPHILS # BLD AUTO: 0.1 10E3/UL (ref 0–0.2)
BASOPHILS NFR BLD AUTO: 0 %
BENZODIAZ UR QL SCN: NOT DETECTED
BILIRUB UR QL STRIP: ABNORMAL
BUN SERPL-MCNC: 14.8 MG/DL (ref 6–20)
BUPRENORPHINE UR QL: NOT DETECTED
CALCIUM SERPL-MCNC: 8.5 MG/DL (ref 8.6–10)
CANNABINOIDS UR QL: DETECTED
CHLORIDE SERPL-SCNC: 107 MMOL/L (ref 98–107)
CK SERPL-CCNC: 84 U/L (ref 39–308)
COCAINE UR QL SCN: NOT DETECTED
COLOR UR AUTO: ABNORMAL
CREAT SERPL-MCNC: 0.68 MG/DL (ref 0.67–1.17)
D-METHAMPHET UR QL: NOT DETECTED
DEPRECATED HCO3 PLAS-SCNC: 20 MMOL/L (ref 22–29)
EOSINOPHIL # BLD AUTO: 0 10E3/UL (ref 0–0.7)
EOSINOPHIL NFR BLD AUTO: 0 %
ERYTHROCYTE [DISTWIDTH] IN BLOOD BY AUTOMATED COUNT: 12.8 % (ref 10–15)
FLUAV RNA SPEC QL NAA+PROBE: NEGATIVE
FLUBV RNA RESP QL NAA+PROBE: NEGATIVE
GFR SERPL CREATININE-BSD FRML MDRD: >90 ML/MIN/1.73M2
GLUCOSE BLDC GLUCOMTR-MCNC: 109 MG/DL (ref 70–99)
GLUCOSE BLDC GLUCOMTR-MCNC: 119 MG/DL (ref 70–99)
GLUCOSE BLDC GLUCOMTR-MCNC: 161 MG/DL (ref 70–99)
GLUCOSE BLDC GLUCOMTR-MCNC: 176 MG/DL (ref 70–99)
GLUCOSE BLDC GLUCOMTR-MCNC: 91 MG/DL (ref 70–99)
GLUCOSE SERPL-MCNC: 161 MG/DL (ref 70–99)
GLUCOSE UR STRIP-MCNC: NEGATIVE MG/DL
HCT VFR BLD AUTO: 42.6 % (ref 40–53)
HGB BLD-MCNC: 14.6 G/DL (ref 13.3–17.7)
HGB UR QL STRIP: NEGATIVE
HYALINE CASTS: 7 /LPF
IMM GRANULOCYTES # BLD: 0 10E3/UL
IMM GRANULOCYTES NFR BLD: 0 %
KETONES UR STRIP-MCNC: 5 MG/DL
LACTATE SERPL-SCNC: 0.8 MMOL/L (ref 0.7–2)
LEUKOCYTE ESTERASE UR QL STRIP: NEGATIVE
LIPASE SERPL-CCNC: 43 U/L (ref 13–60)
LYMPHOCYTES # BLD AUTO: 1.4 10E3/UL (ref 0.8–5.3)
LYMPHOCYTES NFR BLD AUTO: 12 %
MAGNESIUM SERPL-MCNC: 1.7 MG/DL (ref 1.7–2.3)
MCH RBC QN AUTO: 31.7 PG (ref 26.5–33)
MCHC RBC AUTO-ENTMCNC: 34.3 G/DL (ref 31.5–36.5)
MCV RBC AUTO: 93 FL (ref 78–100)
METHADONE UR QL SCN: NOT DETECTED
MONOCYTES # BLD AUTO: 0.5 10E3/UL (ref 0–1.3)
MONOCYTES NFR BLD AUTO: 5 %
MUCOUS THREADS #/AREA URNS LPF: PRESENT /LPF
NEUTROPHILS # BLD AUTO: 9.8 10E3/UL (ref 1.6–8.3)
NEUTROPHILS NFR BLD AUTO: 83 %
NITRATE UR QL: NEGATIVE
NRBC # BLD AUTO: 0 10E3/UL
NRBC BLD AUTO-RTO: 0 /100
OPIATES UR QL SCN: NOT DETECTED
OXYCODONE UR QL SCN: NOT DETECTED
PCP UR QL SCN: NOT DETECTED
PH UR STRIP: 5 [PH] (ref 5–7)
PHOSPHATE SERPL-MCNC: 2.4 MG/DL (ref 2.5–4.5)
PHOSPHATE SERPL-MCNC: 3 MG/DL (ref 2.5–4.5)
PLATELET # BLD AUTO: 231 10E3/UL (ref 150–450)
POTASSIUM SERPL-SCNC: 4 MMOL/L (ref 3.4–5.3)
PROCALCITONIN SERPL IA-MCNC: 0.06 NG/ML
PROCALCITONIN SERPL IA-MCNC: 0.14 NG/ML
PROPOXYPH UR QL: NOT DETECTED
RBC # BLD AUTO: 4.6 10E6/UL (ref 4.4–5.9)
RBC URINE: 3 /HPF
RSV RNA SPEC NAA+PROBE: NEGATIVE
SARS-COV-2 RNA RESP QL NAA+PROBE: NEGATIVE
SODIUM SERPL-SCNC: 141 MMOL/L (ref 136–145)
SP GR UR STRIP: 1.02 (ref 1–1.03)
TRICYCLICS UR QL SCN: NOT DETECTED
UROBILINOGEN UR STRIP-MCNC: 4 MG/DL
WBC # BLD AUTO: 11.8 10E3/UL (ref 4–11)
WBC URINE: 0 /HPF

## 2023-07-06 PROCEDURE — 84145 PROCALCITONIN (PCT): CPT | Performed by: PEDIATRICS

## 2023-07-06 PROCEDURE — 250N000013 HC RX MED GY IP 250 OP 250 PS 637: Performed by: FAMILY MEDICINE

## 2023-07-06 PROCEDURE — 84100 ASSAY OF PHOSPHORUS: CPT | Performed by: PEDIATRICS

## 2023-07-06 PROCEDURE — 250N000009 HC RX 250: Performed by: PEDIATRICS

## 2023-07-06 PROCEDURE — 99222 1ST HOSP IP/OBS MODERATE 55: CPT | Mod: AI | Performed by: INTERNAL MEDICINE

## 2023-07-06 PROCEDURE — 80048 BASIC METABOLIC PNL TOTAL CA: CPT | Performed by: PEDIATRICS

## 2023-07-06 PROCEDURE — 250N000012 HC RX MED GY IP 250 OP 636 PS 637: Performed by: INTERNAL MEDICINE

## 2023-07-06 PROCEDURE — 81001 URINALYSIS AUTO W/SCOPE: CPT | Performed by: FAMILY MEDICINE

## 2023-07-06 PROCEDURE — 250N000011 HC RX IP 250 OP 636: Performed by: FAMILY MEDICINE

## 2023-07-06 PROCEDURE — 36415 COLL VENOUS BLD VENIPUNCTURE: CPT | Performed by: PEDIATRICS

## 2023-07-06 PROCEDURE — 250N000011 HC RX IP 250 OP 636: Mod: JZ | Performed by: INTERNAL MEDICINE

## 2023-07-06 PROCEDURE — 250N000013 HC RX MED GY IP 250 OP 250 PS 637: Performed by: INTERNAL MEDICINE

## 2023-07-06 PROCEDURE — 258N000003 HC RX IP 258 OP 636: Performed by: INTERNAL MEDICINE

## 2023-07-06 PROCEDURE — 258N000003 HC RX IP 258 OP 636: Performed by: PEDIATRICS

## 2023-07-06 PROCEDURE — 74019 RADEX ABDOMEN 2 VIEWS: CPT

## 2023-07-06 PROCEDURE — 80306 DRUG TEST PRSMV INSTRMNT: CPT | Performed by: FAMILY MEDICINE

## 2023-07-06 PROCEDURE — 83735 ASSAY OF MAGNESIUM: CPT | Performed by: PEDIATRICS

## 2023-07-06 PROCEDURE — 120N000001 HC R&B MED SURG/OB

## 2023-07-06 PROCEDURE — 258N000003 HC RX IP 258 OP 636: Performed by: FAMILY MEDICINE

## 2023-07-06 PROCEDURE — 83690 ASSAY OF LIPASE: CPT | Performed by: PEDIATRICS

## 2023-07-06 PROCEDURE — 85014 HEMATOCRIT: CPT | Performed by: PEDIATRICS

## 2023-07-06 RX ORDER — DEXTROSE MONOHYDRATE 25 G/50ML
25-50 INJECTION, SOLUTION INTRAVENOUS
Status: DISCONTINUED | OUTPATIENT
Start: 2023-07-06 | End: 2023-07-10 | Stop reason: HOSPADM

## 2023-07-06 RX ORDER — PROCHLORPERAZINE MALEATE 5 MG
10 TABLET ORAL EVERY 6 HOURS PRN
Status: DISCONTINUED | OUTPATIENT
Start: 2023-07-06 | End: 2023-07-10 | Stop reason: HOSPADM

## 2023-07-06 RX ORDER — NALOXONE HYDROCHLORIDE 0.4 MG/ML
0.4 INJECTION, SOLUTION INTRAMUSCULAR; INTRAVENOUS; SUBCUTANEOUS
Status: DISCONTINUED | OUTPATIENT
Start: 2023-07-06 | End: 2023-07-10 | Stop reason: HOSPADM

## 2023-07-06 RX ORDER — ONDANSETRON 4 MG/1
4 TABLET, ORALLY DISINTEGRATING ORAL EVERY 6 HOURS PRN
Status: DISCONTINUED | OUTPATIENT
Start: 2023-07-06 | End: 2023-07-10 | Stop reason: HOSPADM

## 2023-07-06 RX ORDER — HYDROMORPHONE HYDROCHLORIDE 1 MG/ML
0.5 INJECTION, SOLUTION INTRAMUSCULAR; INTRAVENOUS; SUBCUTANEOUS ONCE
Status: COMPLETED | OUTPATIENT
Start: 2023-07-06 | End: 2023-07-06

## 2023-07-06 RX ORDER — PANTOPRAZOLE SODIUM 40 MG/1
40 TABLET, DELAYED RELEASE ORAL 2 TIMES DAILY PRN
Status: DISCONTINUED | OUTPATIENT
Start: 2023-07-06 | End: 2023-07-07

## 2023-07-06 RX ORDER — ATORVASTATIN CALCIUM 10 MG/1
20 TABLET, FILM COATED ORAL DAILY
Status: DISCONTINUED | OUTPATIENT
Start: 2023-07-06 | End: 2023-07-10 | Stop reason: HOSPADM

## 2023-07-06 RX ORDER — DULOXETIN HYDROCHLORIDE 20 MG/1
20 CAPSULE, DELAYED RELEASE ORAL DAILY
Status: DISCONTINUED | OUTPATIENT
Start: 2023-07-06 | End: 2023-07-10 | Stop reason: HOSPADM

## 2023-07-06 RX ORDER — BUSPIRONE HYDROCHLORIDE 5 MG/1
5 TABLET ORAL 3 TIMES DAILY
Status: DISCONTINUED | OUTPATIENT
Start: 2023-07-06 | End: 2023-07-08

## 2023-07-06 RX ORDER — NOREPINEPHRINE BITARTRATE 0.02 MG/ML
.01-.6 INJECTION, SOLUTION INTRAVENOUS CONTINUOUS
Status: DISCONTINUED | OUTPATIENT
Start: 2023-07-06 | End: 2023-07-06

## 2023-07-06 RX ORDER — LIDOCAINE 40 MG/G
CREAM TOPICAL
Status: DISCONTINUED | OUTPATIENT
Start: 2023-07-06 | End: 2023-07-10 | Stop reason: HOSPADM

## 2023-07-06 RX ORDER — ONDANSETRON 2 MG/ML
4 INJECTION INTRAMUSCULAR; INTRAVENOUS EVERY 30 MIN PRN
Status: COMPLETED | OUTPATIENT
Start: 2023-07-06 | End: 2023-07-06

## 2023-07-06 RX ORDER — GABAPENTIN 300 MG/1
300 CAPSULE ORAL 3 TIMES DAILY
Status: DISCONTINUED | OUTPATIENT
Start: 2023-07-06 | End: 2023-07-10 | Stop reason: HOSPADM

## 2023-07-06 RX ORDER — PROCHLORPERAZINE 25 MG
25 SUPPOSITORY, RECTAL RECTAL EVERY 12 HOURS PRN
Status: DISCONTINUED | OUTPATIENT
Start: 2023-07-06 | End: 2023-07-10 | Stop reason: HOSPADM

## 2023-07-06 RX ORDER — SODIUM CHLORIDE 9 MG/ML
INJECTION, SOLUTION INTRAVENOUS CONTINUOUS
Status: DISCONTINUED | OUTPATIENT
Start: 2023-07-06 | End: 2023-07-09

## 2023-07-06 RX ORDER — NALOXONE HYDROCHLORIDE 0.4 MG/ML
0.2 INJECTION, SOLUTION INTRAMUSCULAR; INTRAVENOUS; SUBCUTANEOUS
Status: DISCONTINUED | OUTPATIENT
Start: 2023-07-06 | End: 2023-07-10 | Stop reason: HOSPADM

## 2023-07-06 RX ORDER — ONDANSETRON 2 MG/ML
4 INJECTION INTRAMUSCULAR; INTRAVENOUS EVERY 6 HOURS PRN
Status: DISCONTINUED | OUTPATIENT
Start: 2023-07-06 | End: 2023-07-10 | Stop reason: HOSPADM

## 2023-07-06 RX ORDER — LISINOPRIL 10 MG/1
10 TABLET ORAL DAILY
Status: DISCONTINUED | OUTPATIENT
Start: 2023-07-06 | End: 2023-07-10 | Stop reason: HOSPADM

## 2023-07-06 RX ORDER — HYDROMORPHONE HYDROCHLORIDE 1 MG/ML
0.75 INJECTION, SOLUTION INTRAMUSCULAR; INTRAVENOUS; SUBCUTANEOUS
Status: DISCONTINUED | OUTPATIENT
Start: 2023-07-06 | End: 2023-07-10 | Stop reason: HOSPADM

## 2023-07-06 RX ORDER — HYDROMORPHONE HCL IN WATER/PF 6 MG/30 ML
0.4 PATIENT CONTROLLED ANALGESIA SYRINGE INTRAVENOUS
Status: DISCONTINUED | OUTPATIENT
Start: 2023-07-06 | End: 2023-07-10 | Stop reason: HOSPADM

## 2023-07-06 RX ORDER — NICOTINE POLACRILEX 4 MG
15-30 LOZENGE BUCCAL
Status: DISCONTINUED | OUTPATIENT
Start: 2023-07-06 | End: 2023-07-10 | Stop reason: HOSPADM

## 2023-07-06 RX ORDER — POTASSIUM CHLORIDE 1.5 G/1.58G
40 POWDER, FOR SOLUTION ORAL ONCE
Status: COMPLETED | OUTPATIENT
Start: 2023-07-06 | End: 2023-07-06

## 2023-07-06 RX ADMIN — SODIUM CHLORIDE, POTASSIUM CHLORIDE, SODIUM LACTATE AND CALCIUM CHLORIDE: 600; 310; 30; 20 INJECTION, SOLUTION INTRAVENOUS at 00:30

## 2023-07-06 RX ADMIN — HYDROMORPHONE HYDROCHLORIDE 0.4 MG: 0.2 INJECTION, SOLUTION INTRAMUSCULAR; INTRAVENOUS; SUBCUTANEOUS at 20:08

## 2023-07-06 RX ADMIN — GABAPENTIN 300 MG: 300 CAPSULE ORAL at 13:36

## 2023-07-06 RX ADMIN — BUSPIRONE HYDROCHLORIDE 5 MG: 5 TABLET ORAL at 20:07

## 2023-07-06 RX ADMIN — ONDANSETRON 4 MG: 2 INJECTION INTRAMUSCULAR; INTRAVENOUS at 03:36

## 2023-07-06 RX ADMIN — DULOXETINE HYDROCHLORIDE 20 MG: 20 CAPSULE, DELAYED RELEASE ORAL at 08:39

## 2023-07-06 RX ADMIN — ATORVASTATIN CALCIUM 20 MG: 10 TABLET, FILM COATED ORAL at 08:39

## 2023-07-06 RX ADMIN — BUSPIRONE HYDROCHLORIDE 5 MG: 5 TABLET ORAL at 13:36

## 2023-07-06 RX ADMIN — ONDANSETRON 4 MG: 2 INJECTION INTRAMUSCULAR; INTRAVENOUS at 04:02

## 2023-07-06 RX ADMIN — PANTOPRAZOLE SODIUM 40 MG: 40 TABLET, DELAYED RELEASE ORAL at 20:07

## 2023-07-06 RX ADMIN — HYDROMORPHONE HYDROCHLORIDE 0.4 MG: 0.2 INJECTION, SOLUTION INTRAMUSCULAR; INTRAVENOUS; SUBCUTANEOUS at 14:00

## 2023-07-06 RX ADMIN — GABAPENTIN 300 MG: 300 CAPSULE ORAL at 20:07

## 2023-07-06 RX ADMIN — SODIUM CHLORIDE, POTASSIUM CHLORIDE, SODIUM LACTATE AND CALCIUM CHLORIDE 1000 ML: 600; 310; 30; 20 INJECTION, SOLUTION INTRAVENOUS at 02:36

## 2023-07-06 RX ADMIN — INSULIN ASPART 1 UNITS: 100 INJECTION, SOLUTION INTRAVENOUS; SUBCUTANEOUS at 08:38

## 2023-07-06 RX ADMIN — GABAPENTIN 300 MG: 300 CAPSULE ORAL at 08:39

## 2023-07-06 RX ADMIN — HYDROMORPHONE HYDROCHLORIDE 0.5 MG: 1 INJECTION, SOLUTION INTRAMUSCULAR; INTRAVENOUS; SUBCUTANEOUS at 03:47

## 2023-07-06 RX ADMIN — ONDANSETRON HYDROCHLORIDE 4 MG: 2 SOLUTION INTRAMUSCULAR; INTRAVENOUS at 13:35

## 2023-07-06 RX ADMIN — LISINOPRIL 10 MG: 10 TABLET ORAL at 08:39

## 2023-07-06 RX ADMIN — VANCOMYCIN HYDROCHLORIDE 1500 MG: 1 INJECTION, POWDER, LYOPHILIZED, FOR SOLUTION INTRAVENOUS at 00:30

## 2023-07-06 RX ADMIN — INSULIN GLARGINE 10 UNITS: 100 INJECTION, SOLUTION SUBCUTANEOUS at 08:38

## 2023-07-06 RX ADMIN — ONDANSETRON HYDROCHLORIDE 4 MG: 2 SOLUTION INTRAMUSCULAR; INTRAVENOUS at 20:14

## 2023-07-06 RX ADMIN — HYDROMORPHONE HYDROCHLORIDE 0.4 MG: 0.2 INJECTION, SOLUTION INTRAMUSCULAR; INTRAVENOUS; SUBCUTANEOUS at 08:12

## 2023-07-06 RX ADMIN — SODIUM CHLORIDE: 9 INJECTION, SOLUTION INTRAVENOUS at 20:08

## 2023-07-06 RX ADMIN — SODIUM PHOSPHATE, MONOBASIC, MONOHYDRATE AND SODIUM PHOSPHATE, DIBASIC, ANHYDROUS 9 MMOL: 142; 276 INJECTION, SOLUTION INTRAVENOUS at 14:39

## 2023-07-06 RX ADMIN — ONDANSETRON 4 MG: 2 INJECTION INTRAMUSCULAR; INTRAVENOUS at 06:37

## 2023-07-06 RX ADMIN — BUSPIRONE HYDROCHLORIDE 5 MG: 5 TABLET ORAL at 08:39

## 2023-07-06 RX ADMIN — POTASSIUM CHLORIDE 40 MEQ: 1.5 FOR SOLUTION ORAL at 02:46

## 2023-07-06 RX ADMIN — SODIUM CHLORIDE: 9 INJECTION, SOLUTION INTRAVENOUS at 08:12

## 2023-07-06 ASSESSMENT — ACTIVITIES OF DAILY LIVING (ADL)
ADLS_ACUITY_SCORE: 37
ADLS_ACUITY_SCORE: 20
ADLS_ACUITY_SCORE: 20
DRESSING/BATHING_DIFFICULTY: NO
ADLS_ACUITY_SCORE: 20
CHANGE_IN_FUNCTIONAL_STATUS_SINCE_ONSET_OF_CURRENT_ILLNESS/INJURY: NO
ADLS_ACUITY_SCORE: 20
ADLS_ACUITY_SCORE: 20
TOILETING_ISSUES: NO
WALKING_OR_CLIMBING_STAIRS_DIFFICULTY: NO
ADLS_ACUITY_SCORE: 20
FALL_HISTORY_WITHIN_LAST_SIX_MONTHS: NO
ADLS_ACUITY_SCORE: 20
DOING_ERRANDS_INDEPENDENTLY_DIFFICULTY: NO
CONCENTRATING,_REMEMBERING_OR_MAKING_DECISIONS_DIFFICULTY: NO
WEAR_GLASSES_OR_BLIND: NO
DIFFICULTY_EATING/SWALLOWING: NO
ADLS_ACUITY_SCORE: 37

## 2023-07-06 NOTE — PROGRESS NOTES
S:  Transfer to room 256 via w/c with all belongings.    B: Syncopal event, hypotension, abdominal pain    A: Alert and oriented, sleepy, cooperative. Dilaudid given for abdominal pain. Abdomen soft, non tender, active bowel sounds. Lungs clear. Up independently, steady on feet.     R: Transfer to 256 per physician orders. Report given to Brittni RODRIGUEZ. Continue to monitor pt and update physician as needed.

## 2023-07-06 NOTE — ED PROVIDER NOTES
History     Chief Complaint   Patient presents with     Syncope     HPI  Elias Garcia is a 37 year old male who presents to the ED this evening after suffering a syncopal episode at home.  He had dinner and then took his meds about 730.  That included his evening long-acting insulin 25 units.  He takes that twice a day.  Admits to short acting insulin to correct if his sugars are high.  His blood sugar was 114 before he ate.  About an hour or so later he was sitting on the couch watching a movie.  At 2040 he got up and felt very wobbly on his feet.  His vision started closing in and and he passed out and folded up.  His girlfriend managed to catch him so he did not fall and hurt himself.  Folded up to his knees.  Had a blank stare and some twitching of the arms and legs.  A friend carried him to the car.  He came to in about 1 or 2 minutes and was not postictal.  No loss of control of his bladder or bowels.    He was just hospitalized from 6/29 - 6/30/2023.  Originally seen here in the ED and transferred to [facility] because of acute on chronic pancreatitis and some hematemesis.  He was seen by GI but they did not do an upper endoscopy and felt that he just had a Indira-Mcrae tear.  His hemoglobin vitals remained stable.  Actually today he does not have abdominal tenderness like he usually does.  Yesterday he had nausea and vomiting most of the day but has not vomited today.  Past couple of days however he has had more anxiety attacks where he will feel shaky and hot/cold and occasionally get dizzy.  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  Urinating normally.  No diarrhea.    He states he had a couple of seizures out in [place] but sounds likely related to hypoglycemia.  He never had an EEG nor was he started on seizure medications.    They moved to [place] from [place] in March of this year.    After I left the  room, his girlfriend shared with the nurse that she forgot to tell me that she gave him one of her risks.  All earlier tonight to help with his anxiety.  Apparently it helped the anxiety but may have actually contributed to his syncope.    Not sure if he has had fevers or not may have felt a little bit hot yesterday.  Does not smoke cigarettes but admits to smoking marijuana on occasion.  Denies any other drug use.      Allergies:  Allergies   Allergen Reactions     Morphine      Other reaction(s): Altered Mental Status     Sulfa Antibiotics Hives and Rash       Problem List:    Patient Active Problem List    Diagnosis Date Noted     Syncope and collapse 07/06/2023     Priority: Medium     Dehydration 07/06/2023     Priority: Medium     Elevated lactic acid level 07/06/2023     Priority: Medium     Hypotension, unspecified hypotension type 07/06/2023     Priority: Medium     Acute renal failure, unspecified acute renal failure type (H) 07/06/2023     Priority: Medium     Type 2 diabetes mellitus without complication, with long-term current use of insulin (H) 07/06/2023     Priority: Medium     Nausea and vomiting, unspecified vomiting type 07/06/2023     Priority: Medium     Right inguinal hernia 10/02/2018     Priority: Medium     Anxiety 09/04/2018     Priority: Medium     Major depressive disorder, single episode, in partial remission (H) 06/05/2017     Priority: Medium     Hx of cholecystectomy 01/04/2016     Priority: Medium     Formatting of this note might be different from the original.  Pancreatitis 2/2013       Pseudocyst, pancreas 03/19/2013     Priority: Medium     HTN (hypertension) 03/12/2013     Priority: Medium        Past Medical History:    History reviewed. No pertinent past medical history.    Past Surgical History:    Past Surgical History:   Procedure Laterality Date     CHOLECYSTECTOMY         Family History:    History reviewed. No pertinent family history.    Social History:  Marital Status:   Single [1]  Social History     Tobacco Use     Smoking status: Some Days     Types: Cigarettes, Vaping Device     Last attempt to quit: 2022     Years since quittin.8     Smokeless tobacco: Never   Vaping Use     Vaping Use: Some days     Substances: Nicotine     Devices: Disposable   Substance Use Topics     Alcohol use: Not Currently        Medications:    atorvastatin (LIPITOR) 20 MG tablet  busPIRone (BUSPAR) 5 MG tablet  DULoxetine (CYMBALTA) 20 MG capsule  gabapentin (NEURONTIN) 300 MG capsule  insulin NPH (NOVOLIN N VIAL) 100 UNIT/ML vial  lisinopril (ZESTRIL) 10 MG tablet  omeprazole (PRILOSEC) 20 MG DR capsule  acetaminophen (TYLENOL) 500 MG tablet  blood glucose (ONETOUCH VERIO IQ) test strip  blood glucose (ONETOUCH VERIO IQ) test strip  dicyclomine (BENTYL) 10 MG capsule  metoclopramide (REGLAN) 5 MG tablet  omeprazole (PRILOSEC) 40 MG DR capsule  ondansetron (ZOFRAN ODT) 8 MG ODT tab  prochlorperazine (COMPAZINE) 10 MG tablet          Review of Systems   Respiratory: Negative for chest tightness and shortness of breath.    Cardiovascular: Negative for chest pain.   All other systems reviewed and are negative.      Physical Exam   BP: (!) 88/51  Pulse: 86  Temp: 96.9  F (36.1  C)  Resp: 18  Weight: 73 kg (161 lb)  SpO2: 99 %      Physical Exam  Constitutional:       General: He is in acute distress (mild).      Comments: Alert, good color   HENT:      Mouth/Throat:      Comments: Mucosa is tacky    Eyes:      Extraocular Movements: Extraocular movements intact.      Pupils: Pupils are equal, round, and reactive to light.   Cardiovascular:      Rate and Rhythm: Normal rate and regular rhythm.   Pulmonary:      Effort: Pulmonary effort is normal.      Breath sounds: Normal breath sounds.   Abdominal:      Palpations: Abdomen is soft.      Tenderness: There is no abdominal tenderness.   Musculoskeletal:      Cervical back: Normal range of motion.      Right lower leg: No edema.      Left lower leg:  No edema.   Skin:     General: Skin is warm.   Neurological:      General: No focal deficit present.      Mental Status: He is alert and oriented to person, place, and time.   Psychiatric:         Mood and Affect: Mood normal.         ED Course                 Procedures              EKG Interpretation:      Interpreted by Greg Anderson MD  Time reviewed: 2250  Symptoms at time of EKG: post syncope   Rhythm: sinus bradycardia  Rate: 53  Axis: Normal  Ectopy: none  Conduction: normal  ST Segments/ T Waves: No acute ischemic changes  Q Waves: none  Comparison to prior: No old EKG available    Clinical Impression: Sinus bradycardia at 53 bpm.  Otherwise normal EKG.                Critical Care time:  none      Results for orders placed or performed during the hospital encounter of 07/05/23 (from the past 24 hour(s))   [facility] Draw    Narrative    The following orders were created for panel order [facility] Draw.  Procedure                               Abnormality         Status                     ---------                               -----------         ------                     Extra Blue Top Tube[[id number]]                              Final result               Extra Green Top (Lithium...[[id number]]                      Final result               Extra Purple Top Tube[[id number]]                            Final result                 Please view results for these tests on the individual orders.   Extra Blue Top Tube   Result Value Ref Range    Hold Specimen JIC    Extra Green Top (Lithium Heparin) Tube   Result Value Ref Range    Hold Specimen JIC    Extra Purple Top Tube   Result Value Ref Range    Hold Specimen JIC    CBC with platelets differential    Narrative    The following orders were created for panel order CBC with platelets differential.  Procedure                               Abnormality         Status                     ---------                               -----------         ------                      CBC with platelets and d...[[id number]]  Abnormal            Final result                 Please view results for these tests on the individual orders.   Comprehensive metabolic panel   Result Value Ref Range    Sodium 141 136 - 145 mmol/L    Potassium 3.3 (L) 3.4 - 5.3 mmol/L    Chloride 101 98 - 107 mmol/L    Carbon Dioxide (CO2) 28 22 - 29 mmol/L    Anion Gap 12 7 - 15 mmol/L    Urea Nitrogen 24.6 (H) 6.0 - 20.0 mg/dL    Creatinine 1.38 (H) 0.67 - 1.17 mg/dL    Calcium 9.5 8.6 - 10.0 mg/dL    Glucose 125 (H) 70 - 99 mg/dL    Alkaline Phosphatase 61 40 - 129 U/L    AST 23 0 - 45 U/L    ALT 26 0 - 70 U/L    Protein Total 6.9 6.4 - 8.3 g/dL    Albumin 4.2 3.5 - 5.2 g/dL    Bilirubin Total 0.7 <=1.2 mg/dL    GFR Estimate 67 >60 mL/min/1.73m2   Lipase   Result Value Ref Range    Lipase 108 (H) 13 - 60 U/L   Troponin T, High Sensitivity   Result Value Ref Range    Troponin T, High Sensitivity 11 <=22 ng/L   CBC with platelets and differential   Result Value Ref Range    WBC Count 11.2 (H) 4.0 - 11.0 10e3/uL    RBC Count 4.67 4.40 - 5.90 10e6/uL    Hemoglobin 14.9 13.3 - 17.7 g/dL    Hematocrit 43.0 40.0 - 53.0 %    MCV 92 78 - 100 fL    MCH 31.9 26.5 - 33.0 pg    MCHC 34.7 31.5 - 36.5 g/dL    RDW 12.8 10.0 - 15.0 %    Platelet Count 369 150 - 450 10e3/uL    % Neutrophils 48 %    % Lymphocytes 42 %    % Monocytes 7 %    % Eosinophils 2 %    % Basophils 1 %    % Immature Granulocytes 0 %    NRBCs per 100 WBC 0 <1 /100    Absolute Neutrophils 5.4 1.6 - 8.3 10e3/uL    Absolute Lymphocytes 4.6 0.8 - 5.3 10e3/uL    Absolute Monocytes 0.8 0.0 - 1.3 10e3/uL    Absolute Eosinophils 0.2 0.0 - 0.7 10e3/uL    Absolute Basophils 0.1 0.0 - 0.2 10e3/uL    Absolute Immature Granulocytes 0.0 <=0.4 10e3/uL    Absolute NRBCs 0.0 10e3/uL   CK total   Result Value Ref Range    CK 84 39 - 308 U/L   [facility] Draw    Narrative    The following orders were created for panel order [facility] Draw.  Procedure                                Abnormality         Status                     ---------                               -----------         ------                     Extra Blood Culture Bottle[[id number]]                       Final result               Extra Heparinized Syringe[[id number]]                        Final result                 Please view results for these tests on the individual orders.   Extra Blood Culture Bottle   Result Value Ref Range    Hold Specimen JIC    Extra Heparinized Syringe   Result Value Ref Range    Hold Specimen in lab    Lactic acid whole blood   Result Value Ref Range    Lactic Acid 2.3 (H) 0.7 - 2.0 mmol/L   Blood gas venous   Result Value Ref Range    pH Venous 7.36 7.32 - 7.43    pCO2 Venous 56 (H) 40 - 50 mm Hg    pO2 Venous 30 25 - 47 mm Hg    Bicarbonate Venous 31 (H) 21 - 28 mmol/L    Base Excess/Deficit (+/-) 3.7 (H) -7.7 - 1.9 mmol/L    FIO2 21    Glucose by meter   Result Value Ref Range    GLUCOSE BY METER POCT 89 70 - 99 mg/dL   Symptomatic Influenza A/B, RSV, & SARS-CoV2 PCR (COVID-19) Nasopharyngeal    Specimen: Nasopharyngeal; Swab   Result Value Ref Range    Influenza A PCR Negative Negative    Influenza B PCR Negative Negative    RSV PCR Negative Negative    SARS CoV2 PCR Negative Negative    Narrative    Testing was performed using the Xpert Xpress CoV2/Flu/RSV Assay on the [employer] GeneXpert Instrument. This test should be ordered for the detection of SARS-CoV-2, influenza, and RSV viruses in individuals who meet clinical and/or epidemiological criteria. Test performance is unknown in asymptomatic patients. This test is for in vitro diagnostic use under the FDA EUA for laboratories certified under CLIA to perform high or moderate complexity testing. This test has not been FDA cleared or approved. A negative result does not rule out the presence of PCR inhibitors in the specimen or target RNA in concentration below the limit of detection for the assay. If only one viral target is positive but  coinfection with multiple targets is suspected, the sample should be re-tested with another FDA cleared, approved, or authorized test, if coinfection would change clinical management. This test was validated by the [facility]. These laboratories are certified under the Clinical Laboratory Improvement Amendments of 1988 (CLIA-88) as qualified to perform high complexity laboratory testing.   XR Chest Port 1 View    Narrative    EXAM: XR CHEST PORT 1 VIEW  LOCATION: [facility]  DATE: 7/5/2023    INDICATION: Sepsis.  COMPARISON: None.    FINDINGS: Normal heart size and pulmonary vascularity. No acute appearing infiltrates or consolidation. Somewhat circumscribed 1.3 cm nodular density projects over the right upper lung and is suspected to be due to normal variant irregularity of the   right first anterior costochondral junction. Recommend follow-up chest x-ray in 2-3 months for reevaluation. Chest is otherwise negative. No definite acute findings.      Impression    IMPRESSION:   No definite acute findings. 1.3 cm somewhat circumscribed nodular density over the right upper chest is favored to be related to the right first anterior costochondral junction. Recommend follow-up chest x-ray in 2-3 months to monitor for stability of   this appearance.   Lactic acid whole blood   Result Value Ref Range    Lactic Acid 0.8 0.7 - 2.0 mmol/L   UA with Microscopic reflex to Culture    Specimen: Urine, NOS   Result Value Ref Range    Color Urine Nabila (A) Colorless, Straw, Light Yellow, Yellow    Appearance Urine Slightly Cloudy (A) Clear    Glucose Urine Negative Negative mg/dL    Bilirubin Urine Small (A) Negative    Ketones Urine 5 (A) Negative mg/dL    Specific Gravity Urine 1.025 1.003 - 1.035    Blood Urine Negative Negative    pH Urine 5.0 5.0 - 7.0    Protein Albumin Urine 30 (A) Negative mg/dL    Urobilinogen Urine 4.0 (A) Normal, 2.0 mg/dL    Nitrite Urine Negative  Negative    Leukocyte Esterase Urine Negative Negative    Mucus Urine Present (A) None Seen /LPF    RBC Urine 3 (H) <=2 /HPF    WBC Urine 0 <=5 /HPF    Hyaline Casts Urine 7 (H) <=2 /LPF    Narrative    Urine Culture not indicated   Urine Drugs of Abuse Screen    Narrative    The following orders were created for panel order Urine Drugs of Abuse Screen.  Procedure                               Abnormality         Status                     ---------                               -----------         ------                     Urine Drugs of Abuse Scr...[[id number]]  Abnormal            Final result                 Please view results for these tests on the individual orders.   Urine Drugs of Abuse Screen Panel 13   Result Value Ref Range    Cannabinoids (80-mdv-3-carboxy-9-THC) Detected (A) Not Detected, Indeterminate    Phencyclidine Not Detected Not Detected, Indeterminate    Cocaine (Benzoylecgonine) Not Detected Not Detected, Indeterminate    Methamphetamine (d-Methamphetamine) Not Detected Not Detected, Indeterminate    Opiates (Morphine) Not Detected Not Detected, Indeterminate    Amphetamine (d-Amphetamine) Not Detected Not Detected, Indeterminate    Benzodiazepines (Nordiazepam) Not Detected Not Detected, Indeterminate    Tricyclic Antidepressants (Desipramine) Not Detected Not Detected, Indeterminate    Methadone Not Detected Not Detected, Indeterminate    Barbiturates (Butalbital) Not Detected Not Detected, Indeterminate    Oxycodone Not Detected Not Detected, Indeterminate    Propoxyphene (Norpropoxyphene) Not Detected Not Detected, Indeterminate    Buprenorphine Not Detected Not Detected, Indeterminate       Medications   lactated ringers infusion (0 mLs Intravenous Stopped 7/6/23 0237)   norepinephrine (LEVOPHED) 4 mg in  mL infusion PREMIX (0 mcg/kg/min × 73 kg Intravenous Hold 7/6/23 0237)   lactated ringers BOLUS 1,000 mL (1,000 mLs Intravenous $New Bag 7/6/23 0236)   0.9% sodium chloride BOLUS  (0 mLs Intravenous Stopped 7/5/23 2220)   0.9% sodium chloride BOLUS (0 mLs Intravenous Stopped 7/5/23 2305)   0.9% sodium chloride BOLUS (0 mLs Intravenous Stopped 7/5/23 2329)   piperacillin-tazobactam (ZOSYN) 4.5 g vial to attach to  mL bag (0 g Intravenous Stopped 7/6/23 0029)   vancomycin (VANCOCIN) 1,500 mg in sodium chloride 0.9 % 250 mL intermittent infusion (0 mg Intravenous Stopped 7/6/23 0203)   potassium chloride (KLOR-CON) Packet 40 mEq (40 mEq Oral $Given 7/6/23 0246)       Assessments & Plan (with Medical Decision Making)  37-year-old diabetic on insulin had a normal blood sugar prior to eating tonight.  Took his usual dose of insulin.  About an hour later got up from the couch and had a syncopal episode that sounds to be orthostatic.  Some twitching type movements when he was out he was not postictal and does not sound like true seizure activity.  Incidentally, he had a couple of what sounds to be hypoglycemic seizures in [place] last year where they used to live.  Lots of vomiting yesterday but none today.  Still urinating.  Blood pressures are typically high but running low today but not tachycardic.  No fevers chills or sweats.  No chest pain or shortness of breath.  BP 89/43, pulse of 52, temp 96.9.  His color looks good.  His lungs are clear heart is regular.  Abdomen is soft and nontender which is not usual for him.  His neuro exam was unremarkable.  1L normal saline while waiting for labs to come back.  His girlfriend shared later on that she gave him one of her risperidone tonight to help with his anxiety.  It was effective for that but may have contributed to his syncopal episode.  Blood pressures remained low.  His white count came back slightly elevated 11.2.  Lactic acid 2.3.  He was given another liter of normal saline.  Blood cultures ordered and we will start him on Zosyn and vancomycin for possible sepsis of unclear etiology.  Chest x-ray ordered.  Still has not given a urine  sample.  He now tells me that he maybe had fevers yesterday.  Possibly felt hot but is not quite certain.  No cough or sore throat.  BUN and creatinine are elevated from his baseline.  Dehydration may be contributing significantly.  Urine is quite dark.  CK added to his labs.  Chest x-ray was reviewed by radiology and felt to be clear.  UA shows no signs of infection.  Specific gravity 1.025.  Repeat lactic acid down to normal at 0.8.  He is feeling a lot better after 4 L of crystalloid but his systolic blood pressure still hovering in the upper 80s.  He is not tachycardic whatsoever and actually bradycardic in the 50s.  He is only on lisinopril for blood pressure.  Not on any beta-blockers or calcium channel blockers.  Denies any toxic ingestions/exposures.  I did a limited bedside ultrasound and his IVC measures 2.3 cm.  LV function appears grossly normal.  Systolic blood pressures finally came up into the 90s with a map of 65 just as we are about to start norepinephrine.  We will give him another liter of LR as he may just be profoundly dehydrated from all of the vomiting yesterday.  Has also been dealing with pancreatitis although his abdomen is nontender today.  I think we can hold off on repeating an abdominal CT for now.  Antibiotics just finished.  He will need to stay in the hospital for continued IV fluids, IV antibiotics while waiting for his cultures to come back.  Could get a formal echocardiogram in the morning as well although he certainly does not seem to be in any kind of heart failure right now.  Clinically, he looks better than his numbers.  MAP up to 70.  We will continue with the IV fluids and hold off on therapy for now. May want to watch him on the intermediate care tonight until he declares himself either way.  Hospitalist paged.  CK normal.  BP now up to 110/71 on Liter #5 of crystalloid.  If his BP stays there, he could go to the floor.  Blood pressure continues to go up.  He is now  nauseous and started vomiting again.  I spoke with Dr. Bacon, hospitalist on call.  She has assumed his care and write orders.  She has typically get an abdominal x-ray just to make sure he does not have an obstruction.  We will get that before he goes up to the floor.  His abdominal exam was unremarkable when he arrived.  He has urinated a couple more times and his urine has cleared up.  Nursing staff told me that he just now started complaining of some abdominal pain and is requesting pain meds.  Had no abd pain when he arrived in the ED.  His lipase is up a little bit at 108.  Was just hospitalized with pancreatitis.  Could recheck that in the morning and trend it.  Could consider repeating his abdominal CT if his clinical course worsens.     I have reviewed the nursing notes.    I have reviewed the findings, diagnosis, plan and need for follow up with the patient.       ED to Inpatient Handoff:    Discussed with Dr Bacon at 0334  Patient accepted for Inpatient Stay  Pending studies include abd xray, blood cultures  Code Status: Not Addressed               Medical Decision Making  The patient's presentation was of high complexity (an acute health issue posing potential threat to life or bodily function).    The patient's evaluation involved:  discussion of management or test interpretation with another health professional (see separate area of note for details)    The patient's management necessitated high risk (a decision regarding hospitalization).        New Prescriptions    No medications on file       Final diagnoses:   Syncope and collapse   Dehydration   Acute renal failure, unspecified acute renal failure type (H) - suspect prerenal   Elevated lactic acid level - suspect due to dehydration, sepsis possible but no source of infection identified at present, cultures pending   Hypotension, unspecified hypotension type   Type 2 diabetes mellitus without complication, with long-term current use of insulin (H)    Nausea and vomiting, unspecified vomiting type   Hypokalemia - mild 3.3       7/5/2023   [facility] EMERGENCY DEPT     Greg Anderson MD  07/06/23 6876

## 2023-07-06 NOTE — PLAN OF CARE
Goal Outcome Evaluation:      Plan of Care Reviewed With: patient    Overall Patient Progress: improvingOverall Patient Progress: improving    Outcome Evaluation: 4 hour shift note: Pt A&Ox4. VSS on RA. Independent in the room. Continues to be NPO with q4h glucose checks. Last reading 109, no insuling coverage needed. NS running at 75 mL/hr. Lethargic and sleeping since transferred to MS floor. No PRN's given.

## 2023-07-06 NOTE — ED NOTES
ED Nursing criteria listed below was addressed during verbal handoff:     Abnormal vitals: Yes  Abnormal results: Yes  Med Reconciliation completed: Yes  Meds given in ED: Yes  Any Overdue Meds: No  Core Measures: N/A  Isolation: No  Special needs: No  Skin assessment: Yes    Observation Patient  Education provided: N/A

## 2023-07-06 NOTE — TELEPHONE ENCOUNTER
Central Prior Authorization Team - Phone: [phone]     Prior Authorization Not Needed per Insurance    Medication: OMEPRAZOLE 40 MG PO CPDR  Insurance Company: [employer] Part D - Phone [phone] Fax [phone]  Expected CoPay:      Pharmacy Filling the Rx: [id number] - [place], [place] - [address]  Pharmacy Notified: Yes  Patient Notified: Yes pharmacy will notify when ready

## 2023-07-06 NOTE — UTILIZATION REVIEW
Admission Status; Secondary Review Determination         Under the authority of the Utilization Management Committee, the utilization review process indicated a secondary review on the above patient.  The review outcome is based on review of the medical records, discussions with staff, and applying clinical experience noted on the date of the review.        (x)      Inpatient Status Appropriate - This patient's medical care is consistent with medical management for inpatient care and reasonable inpatient medical practice.       RATIONALE FOR DETERMINATION   The patient is a 37-year-old male admitted on 7/5/2023.  Patient came to the ED after suffering several syncopal episodes at home.  He is diabetic and was noted to have a blood sugar of 46 during his initial admission.  She has a recent hospitalization for pancreatitis and CT done on this admission does show chronic pancreatitis findings.  He had some significant emesis and then one note in the chart are described hyperemesis.  He was judged to be extremely dehydrated and was given 4 L of normal saline.  Creatinine was noted to be elevated at 1.38 with recent creatinine at 0.61.  Emesis was positive for occult blood.  Lactic acid level is 2.3 and lipase is increased to 108.  Venous blood gases show a slightly elevated bicarb not consistent with diabetic or metabolic ketoacidosis.  There is some elevation in serum ketones.  No mention of bowel obstruction on the CT of his abdomen.  Blood pressure elevated following normal saline fluid resuscitation to a high of 179/120.  White blood cell count elevated at 12.2.  Current diagnosis is initial hypotension thought to be due to severe vomiting.  Acute on chronic pancreatitis and started on Zofran and Dilaudid.  Source of other infection is unknown going with urinalysis not positive for UTI.  Initial blood cultures are pending.  Based on complexity and severe dehydration along with possible underlying infection, agree  with current inpatient status.      The severity of illness, intensity of service provided, expected LOS and risk for adverse outcome make the care complex, high risk and appropriate for hospital admission.        The information on this document is developed by the utilization review team in order for the business office to ensure compliance.  This only denotes the appropriateness of proper admission status and does not reflect the quality of care rendered.         The definitions of Inpatient Status and Observation Status used in making the determination above are those provided in the CMS Coverage Manual, Chapter 1 and Chapter 6, section 70.4.      Sincerely,     Julian Mejía MD  Physician Advisor  Utilization Review/ Case Management  [facility].

## 2023-07-06 NOTE — PROGRESS NOTES
[facility]    Medicine Progress Note - Hospitalist Service    Date of Admission:  7/5/2023    Assessment & Plan   37-year-old man with type 2 diabetes treated with insulin, hypertension, history of chronic pancreatitis, and remote history of cholecystectomy who presented to ER after syncopal event and was found to be severely hypotensive.  Due to concerns for refractory hypotension and acute on chronic pancreatitis, he was admitted.    Probable hypovolemic shock with elevated lactic acid, acute renal failure, and syncope  Presented with severe hypotension that persisted for over 5 hours in the ER and did not improve until he had received 5 L IV fluid resuscitation.  Severe hypotension was associated with acute renal failure and elevated lactic acid level as well as syncope raising concern for shock.  Severe hypotension was probably multifactorial including hypovolemia after repeated vomiting but also the effect of chronic lisinopril treatment (which he has been previously prescribed to treat hypertension) and adverse effect of risperidone which he took yesterday (but which had not been prescribed for him).  After extensive IV fluid resuscitation, hypotension has resolved and he did not require initiation of vasopressor therapy.  Other signs of shock also are resolving including recovery of lactic acid and renal function to normal and he has not had any recurrent episodes of syncope.  Although there was concern for possible infection initially, infection has not been identified nor is it suspected at this time, so sepsis and septic shock seem to have been ruled out.  Clinical course has not been worrisome for cardiogenic shock.  -Continue IV fluids at maintenance  -Monitor blood pressure closely after restarting chronic dose of lisinopril    Acute on chronic pancreatitis  Presented with recent history of worsening nausea and vomiting and did develop onset of abdominal pain in the ER.   Patient reporting current symptoms are similar to symptoms he has had with his episodes of pancreatitis in the past, and he reports that he has been diagnosed with chronic pancreatitis.  Recent abdomen CT performed June 29 had demonstrated radiographic findings suspicious for chronic pancreatitis.  Lipase was mildly elevated initially which could have been due to pancreatitis in a patient who has chronic pancreatitis, but lipase has now normalized today.  He currently remains symptomatic, but symptoms do appear to be improving.  -Keep n.p.o. for now but if his abdominal pain continues to improve over the course of the day may attempt trial of clear liquids this evening  -Continue symptomatic treatment of pain with IV Dilaudid as needed and if he is needing ongoing doses of IV Dilaudid for abdominal pain would not yet advance diet  -Continue symptomatic treatment of nausea with antiemetics as needed    Intractable nausea and vomiting  Cannabis use  GI symptoms are probably due at least partly to acute on chronic pancreatitis.  However, he also uses cannabis and could have cannabinoid hyperemesis syndrome.  -N.p.o. for now  -Advised abstinence from use of cannabis and discussed cannabinoid hyperemesis syndrome  -Continue symptomatic treatment with antiemetics as needed    Hypokalemia  Hypophosphatemia  Presented with potassium 3.3 and has phosphorus 2.4 most probably due to recent vomiting and poor oral intake.  Hypokalemia and hypophosphatemia could contribute to generalized malaise.  Potassium has normalized.  -Start phosphorus supplementation per standard protocol and monitor as indicated  -Monitor potassium and add potassium supplementation as indicated     Hypertension  Chronically takes lisinopril to treat hypertension.  Had taken lisinopril July 5.  Presented with severe hypotension but after treatment in the ER has been persistently hypertensive.  -Lisinopril restarted today after recovery from severe  hypotension    Type 2 diabetes treated with insulin  Chronically using insulin to treat diabetes with most recent hemoglobin A1c 6.6 on June 29.  Blood sugars adequately controlled during hospitalization so far but has not yet advance diet.  -Monitor blood sugars every 4 hours while n.p.o. or 4 times daily once diet advances  -Use medium NovoLog insulin correction scale  -Decrease Daily basal insulin dose to 10 units glargine insulin while n.p.o. (compared with chronic basal insulin dosing of 25 units NPH insulin twice daily)    Anxiety and depression  Chronically taking duloxetine, buspirone, and gabapentin with previous diagnoses of anxiety and depression which appear to be generally stable although are difficult to assess reliably because of acute illness.  -Continue chronic doses of duloxetine, buspirone, and gabapentin       Diet: NPO for Medical/Clinical Reasons Except for: Meds    DVT Prophylaxis: Pneumatic Compression Devices  Watson Catheter: Not present  Lines: None     Cardiac Monitoring: ACTIVE order. Indication: Syncope- high cardiac risk (48 hours)  Code Status: Full Code      Clinically Significant Risk Factors Present on Admission                          Disposition Plan      Expected Discharge Date: 07/08/2023      Destination: home            Tanmay Morgan MD  Hospitalist Service  [facility]  Securely message with [employer] (more info)  Text page via [employer] Paging/Directory   ______________________________________________________________________    Interval History   Patient was admitted to the hospital early this morning.  He says he feels tired today.  His nausea has improved and he has not had any further vomiting.  However, he continues to have abdominal pain that has been severe enough that he has been requesting doses of IV Dilaudid which helps reduce the abdominal pain.  Abdominal pain is located in the mid abdomen and reminds him of pain that he has had in the  past with pancreatitis.  He has received 2 doses of IV Dilaudid so far today overall.  He is not yet hungry.  He has not had a bowel movement for about 2 days although reports that it is not unusual for him to go a few days without a bowel movement.  He says this is his third hospitalization in the last week for the same symptoms of nausea, vomiting, and abdominal pain.  Says he starts to feel better after a day or so and then discharges from the hospital.  He feels good for about a day after hospital discharge and then starts to feel worse again.  Today he denies any dizziness although he has been mostly in bed resting.  He has been afebrile.  He went from severely hypotensive from about 9 PM to 2:30 AM to hypertensive since about 3 AM today.  He has remained hypertensive even after taking his normal dose of lisinopril again today which he had also taken yesterday morning.  It was reported in the ER the patient had also taken a dose of risperidone yesterday that was apparently given to him by his girlfriend because he has not been prescribed risperidone for himself.  He took the risperidone for treatment of anxiety.  Oxygenation has been normal.  He has been voiding more frequently today.    Additional history is obtained from the patient.  He says he had gallbladder attacks culminating in cholecystectomy in 2013.  He says he did not have pancreatitis at that time.  He developed diabetes that was not well treated with oral medication and he ultimately started insulin therapy.  In about 2016, he started to have recurring episodes of pancreatitis and was ultimately diagnosed with chronic pancreatitis.  He does not recall being told about a cause for his chronic pancreatitis.  He does not recall being told that he had high triglyceride levels in the past.  He formally used alcohol when he was a younger man in his 20s but has not since then.  In reviewing his cannabis use, he reports that he has noted through time and  "that use of cannabis can exacerbate his GI symptoms including nausea and vomiting.  He will often take a hot shower.  If he uses even more cannabis at those times, his symptoms of abdominal pain and nausea actually improve.  He does not recall receiving information in the past about cannabinoid hyperemesis syndrome.    Physical Exam   Vital Signs: Temp: 98.8  F (37.1  C) Temp src: Oral BP: (!) 156/92 Pulse: 65   Resp: 12 SpO2: 98 % O2 Device: None (Room air)     Patient Vitals for the past 24 hrs:   BP Temp Temp src Pulse Resp SpO2 Height Weight   07/06/23 1141 (!) 156/92 98.8  F (37.1  C) Oral 65 12 98 % -- --   07/06/23 0812 (!) 169/105 -- -- 57 12 99 % -- --   07/06/23 0700 (!) 153/88 -- -- 58 11 98 % -- --   07/06/23 0648 (!) 174/104 98.1  F (36.7  C) -- 60 16 100 % -- --   07/06/23 0643 (!) 179/106 -- -- 63 16 100 % 1.702 m (5' 7\") 74.1 kg (163 lb 4.8 oz)   07/06/23 0630 (!) 152/104 -- -- 61 14 97 % -- --   07/06/23 0614 -- -- -- 65 10 98 % -- --   07/06/23 0604 (!) 147/90 -- -- 76 -- 97 % -- --   07/06/23 0534 (!) 148/98 -- -- 65 -- 96 % -- --   07/06/23 0524 (!) 154/103 -- -- 67 -- 97 % -- --   07/06/23 0400 (!) 156/94 -- -- 76 23 97 % -- --   07/06/23 0344 -- 97.8  F (36.6  C) Oral 77 14 100 % -- --   07/06/23 0330 (!) 163/120 -- -- 80 10 99 % -- --   07/06/23 0315 (!) 145/96 -- -- 86 14 94 % -- --   07/06/23 0300 110/71 -- -- (!) 42 14 100 % -- --   07/06/23 0245 95/51 -- -- 53 10 99 % -- --   07/06/23 0230 94/54 -- -- (!) 48 14 98 % -- --   07/06/23 0200 (!) 88/50 -- -- 50 12 97 % -- --   07/06/23 0145 (!) 87/47 -- -- 51 13 97 % -- --   07/06/23 0141 -- -- -- 51 12 98 % -- --   07/06/23 0126 (!) 82/47 -- -- 52 12 98 % -- --   07/06/23 0111 (!) 84/45 -- -- 50 13 98 % -- --   07/06/23 0056 (!) 85/46 -- -- 56 13 98 % -- --   07/06/23 0041 (!) 86/46 -- -- 51 (!) 6 97 % -- --   07/06/23 0030 (!) 86/43 -- -- 55 13 97 % -- --   07/06/23 0015 (!) 75/34 -- -- 51 14 97 % -- --   07/06/23 0000 (!) 90/35 -- -- 51 (!) " 6 98 % -- --   07/05/23 2345 (!) 83/49 -- -- 51 11 97 % -- --   07/05/23 2315 (!) 82/47 -- -- 52 14 95 % -- --   07/05/23 2307 (!) 83/45 -- -- 52 (!) 5 97 % -- --   07/05/23 2300 -- -- -- 50 14 99 % -- --   07/05/23 2245 (!) 77/38 -- -- 54 16 97 % -- --   07/05/23 2215 (!) 84/45 -- -- 62 16 98 % -- --   07/05/23 2202 (!) 89/43 -- -- 52 -- 99 % -- --   07/05/23 2145 95/50 -- -- 53 -- 97 % -- --   07/05/23 2117 (!) 88/51 96.9  F (36.1  C) Temporal 86 18 99 % -- 73 kg (161 lb)     Weight: 163 lbs 4.8 oz   Vitals:    07/05/23 2117 07/06/23 0643   Weight: 73 kg (161 lb) 74.1 kg (163 lb 4.8 oz)     Wt Readings from Last 4 Encounters:   07/06/23 74.1 kg (163 lb 4.8 oz)   06/29/23 73 kg (161 lb)   06/27/23 74.8 kg (164 lb 12.8 oz)   06/21/23 76.2 kg (168 lb 1.6 oz)       Intake/Output Summary (Last 24 hours) at 7/6/2023 1329  Last data filed at 7/6/2023 0812  Gross per 24 hour   Intake 5300 ml   Output 2545 ml   Net 2755 ml     General Appearance: Appears tired but no signs of acute distress while lying in bed  Respiratory: Normal respiratory effort, easily speaks full sentences  Cardiovascular: Regular rate and rhythm, good radial pulse, normal capillary refill  GI: Hypoactive bowel sounds, nondistended abdomen, soft, mild right-sided abdominal tenderness without peritoneal signs     Medical Decision Making     65 MINUTES SPENT BY ME on the date of service doing chart review, history, exam, documentation & further activities per the note.      Data     I have personally reviewed the following data over the past 24 hrs:    11.8 (H)  \   14.6   / 231     141 107 14.8 /  119 (H)   4.0 20 (L) 0.68 \       ALT: 26 AST: 23 AP: 61 TBILI: 0.7   ALB: 4.2 TOT PROTEIN: 6.9 LIPASE: 43       Trop: 11 BNP: N/A       Procal: 0.06 (H) CRP: N/A Lactic Acid: 0.8          Blood sugars ranged   Procalcitonin yesterday was 0.14 and initial lactic acid was 2.3  Lipase yesterday was 108 with 60 the upper limit of normal  Magnesium 1.7,  phosphorus 2.4  Blood cultures are pending    Imaging results reviewed over the past 24 hrs:   Recent Results (from the past 24 hour(s))   XR Chest Port 1 View    Narrative    EXAM: XR CHEST PORT 1 VIEW  LOCATION: [facility]  DATE: 7/5/2023    INDICATION: Sepsis.  COMPARISON: None.    FINDINGS: Normal heart size and pulmonary vascularity. No acute appearing infiltrates or consolidation. Somewhat circumscribed 1.3 cm nodular density projects over the right upper lung and is suspected to be due to normal variant irregularity of the   right first anterior costochondral junction. Recommend follow-up chest x-ray in 2-3 months for reevaluation. Chest is otherwise negative. No definite acute findings.      Impression    IMPRESSION:   No definite acute findings. 1.3 cm somewhat circumscribed nodular density over the right upper chest is favored to be related to the right first anterior costochondral junction. Recommend follow-up chest x-ray in 2-3 months to monitor for stability of   this appearance.   XR Abdomen 2 Views    Narrative    EXAM: XR ABDOMEN 2 VIEWS  LOCATION: [facility]  DATE: 7/6/2023    INDICATION: Vomiting.  COMPARISON: CT abdomen and pelvis with IV contrast 06/29/2023.      Impression    IMPRESSION: Cholecystectomy. Scattered gas and stool material within normal caliber bowel. No mechanical obstruction or free gas. No opaque urinary tract calculi. Anatomic alignment of the bones and joints. Visualized lower lungs are clear.     Recent Labs   Lab 07/06/23  1139 07/06/23  0832 07/06/23  0805 07/05/23  2247 07/05/23  2126 06/29/23  1702 06/29/23  1549   WBC  --  11.8*  --   --  11.2*  --  12.2*   HGB  --  14.6  --   --  14.9  --  16.3   MCV  --  93  --   --  92  --  92   PLT  --  231  --   --  369  --  305   NA  --  141  --   --  141  --  142   POTASSIUM  --  4.0  --   --  3.3*  --  4.2   CHLORIDE  --  107  --   --  101  --  106   CO2  --  20*  --    --  28  --  23   BUN  --  14.8  --   --  24.6*  --  18.2   CR  --  0.68  --   --  1.38*  --  0.69   ANIONGAP  --  14  --   --  12  --  13   IRMA  --  8.5*  --   --  9.5  --  9.7   * 161* 161*   < > 125*   < > 54*   ALBUMIN  --   --   --   --  4.2  --  4.5   PROTTOTAL  --   --   --   --  6.9  --  7.6   BILITOTAL  --   --   --   --  0.7  --  1.1   ALKPHOS  --   --   --   --  61  --  71   ALT  --   --   --   --  26  --  33   AST  --   --   --   --  23  --  28   LIPASE  --  43  --   --  108*  --  26    < > = values in this interval not displayed.

## 2023-07-06 NOTE — PROGRESS NOTES
"S-(situation): Patient arrives to room 218 via cart from ED    B-(background): Low blood pressure with abdominal pain.     A-(assessment): Pt arrived alert and oriented, able to ambulate independently, was somewhat drowsy on arrival but able to participate in the admission process. Blood pressure is elevated on arrival, Pt has no complaints of pain at the time of arrival, all other vitals are stable.   BP (!) 174/104   Pulse 60   Temp 98.1  F (36.7  C)   Resp 16   Ht 1.702 m (5' 7\")   Wt 74.1 kg (163 lb 4.8 oz)   SpO2 100%   BMI 25.58 kg/m    Wife called and and update was given by the charge nurse.   Initial blood sugar was 176.  Pt is sleepy at the time of arrival and now appears to be resting comfortably.     R-(recommendations): Orders reviewed with Pt. Will monitor patient per MD orders.     Inpatient nursing criteria listed below were met:    Health care directives status obtained and documented: Yes  VTE ordered/documented: Yes  Skin issues/needs documented:NA  Isolation addressed and Signage used: NA  Fall Prevention: Care plan updated Yes Education given and documented Yes  Care Plan initiated and Co-Morbidities added: Yes  Education Assessment documented:Yes  Admission Education Documented: Yes  If present CAUTI/CLABI Education done: NA  New medication patient education completed and documented (Possible Side Effects of Common Medications handout): Yes  Allergies Reviewed: Yes  Admission Medication Reconciliation completed: Yes  Home medications if not able to send immediately home with family stored here: NA  Reminder note placed in discharge instructions regarding home meds: NA  Individualized care needs/preferences addressed and charted: Yes  Provider Notified that patient has arrived to the unit: Yes        "

## 2023-07-06 NOTE — ED TRIAGE NOTES
Pt states today seemed to be disoriented, had a syncopal episode and a blank stare, states he 'blacked out' for a short time and has been 'sweaty, nauseated'. Has been dealing with pancreatitis and sx causing pt anxiety/panic attacks he says. Pain today is tolerable.      Triage Assessment     Row Name 07/05/23 2115       Triage Assessment (Adult)    Airway WDL WDL       Respiratory WDL    Respiratory WDL WDL       Cardiac WDL    Cardiac WDL WDL

## 2023-07-06 NOTE — H&P
"HOSPITALIST TELEMED ADMISSION H&P    Service Date : 7/6/2023  IDr. Bacon, am located in [place].   Elias Garcia is located in [place] at [facility].  The RN or tech on duty in the ED is assisting me today with this patient.    chief complaint : syncope    History of Present Illness:    Pt is a 36 yo m with a history of chronic pancreatitis, insulin-dependent type 2 diabetes, referred for admission for intractable nausea vomiting, near-syncope.  Patient came into the ER tonight complaining of a near syncopal episode at home.  After getting up from the couch he felt very wobbly, blurry vision and felt like he was going to pass out but he thinks does not think he actually passed out.  He is also had had intractable vomiting for past couple of days. Assoc with left lower abd pain. No diarrhea. States \"probably\" had a fever.  States sx are typical of his pancreatitis episodes.  He was just admitted to the hospital at the end of June for acute on chronic pancreatitis.  He does not drink anymore.  He does smoke marijuana and vapes.    In the ER, patient was afebrile, hypotensive 88/51.  BMP showed a potassium of 3.3 creatinine of 1.4, from baseline 0.7.  LFTs were unremarkable.  Procalcitonin was low at 0.14.  CBC was unremarkable.  Initial lactic acid was elevated 2.3 which did normalize after IV fluids in ER.  Flu and COVID test were negative.  Blood cultures were sent.  Urine drug screen was positive for cannabis.  UA showed no evidence of UTI.  KUB showed no evidence of a bowel obstruction.  Chest x-ray mentioned a 1.3 cm nodular density over the right upper chest that they felt was related to the AC joint but recommended follow-up chest x-ray 2-3 months.  Lipase was also elevated at 108.  In the ER, he was given 4 L of normal saline and blood pressure finally normalized and he is now actually hypertensive at 156/94.  He was also given Dilaudid Zofran, 40 mEq potassium, and he also got Zosyn and vancomycin initially " when there was concern for sepsis.    Past Medical History  Recurrent pancreatitis since 2016  DM2     Past Surgical History  Past Surgical History:   Procedure Laterality Date     CHOLECYSTECTOMY        Social History  Elias  reports that he has been smoking cigarettes and vaping device. He has never used smokeless tobacco. He reports that he does not currently use alcohol.  Drug: Marijuana. Quit drinking 9 years ago    Family History  Elias's family history is not on file.    Home Medications  Current Facility-Administered Medications   Medication     lactated ringers infusion     norepinephrine (LEVOPHED) 4 mg in  mL infusion PREMIX     ondansetron (ZOFRAN) injection 4 mg     Current Outpatient Medications   Medication     atorvastatin (LIPITOR) 20 MG tablet     busPIRone (BUSPAR) 5 MG tablet     DULoxetine (CYMBALTA) 20 MG capsule     gabapentin (NEURONTIN) 300 MG capsule     insulin NPH (NOVOLIN N VIAL) 100 UNIT/ML vial     lisinopril (ZESTRIL) 10 MG tablet     omeprazole (PRILOSEC) 20 MG DR capsule     acetaminophen (TYLENOL) 500 MG tablet     blood glucose (ONETOUCH VERIO IQ) test strip     blood glucose (ONETOUCH VERIO IQ) test strip     dicyclomine (BENTYL) 10 MG capsule     metoclopramide (REGLAN) 5 MG tablet     omeprazole (PRILOSEC) 40 MG DR capsule     ondansetron (ZOFRAN ODT) 8 MG ODT tab     prochlorperazine (COMPAZINE) 10 MG tablet       Allergies  Allergies   Allergen Reactions     Morphine      Other reaction(s): Altered Mental Status     Sulfa Antibiotics Hives and Rash      Review Of Systems  10 pt ROS neg except as noted in HPI      Physical Exam:  I performed all aspects of the physical examination via Telemedicine associated with two way audio and video communication.    Vital Signs: Blood pressure (!) 156/94, pulse 76, temperature 97.8  F (36.6  C), temperature source Oral, resp. rate 23, weight 73 kg (161 lb), SpO2 97 %.    Physical Exam    Gen:  Mildly ill-appearing in no acute  distress, lying semi-supine in hospital stretcher  HEENT:  Anicteric sclera, PER, hearing intact to voice  Resp:  No accessory muscle use,  Clear per nursing, TeleMed stethoscope not working  Card:  Regular per nursing  Abd:  Soft per RN exam,  Generalized tenderness, non-distended, normoactive bowel sounds are present  Musc:  Normal strength and movement of the major muscle groups without obvious deformity  Psych:  Good insight, oriented to person, place and time, not anxious, not agitated    DATA  Labs:  I have personally reviewed the following studies:  Recent Labs   Lab 07/05/23 2126   POTASSIUM 3.3*   CHLORIDE 101   CO2 28   BUN 24.6*   ALBUMIN 4.2   ALKPHOS 61   AST 23   ALT 26   LIPASE 108*      Recent Labs   Lab 07/05/23 2126   WBC 11.2*   HGB 14.9   HCT 43.0          Imaging: I have reviewed following:      KUB    ASSESSMENT:  Principal Problem:    Nausea and vomiting, unspecified vomiting type  Active Problems:    Syncope and collapse    Dehydration    Elevated lactic acid level    Hypotension, unspecified hypotension type    Acute renal failure, unspecified acute renal failure type (H)    Type 2 diabetes mellitus without complication, with long-term current use of insulin (H)    PLAN:   1. Hypotension, near-syncope/syncope: Appear to be profoundly dehydrated due to his severe vomiting.  Hypotension has resolved after 5 L of IV fluids in the ER.  Monitor on telemetry and monitor blood pressure.  Continue IV fluids gently now.  I do not think he is septic and I do not think we need any further antibiotics.  No clear source of bacterial infection.    2. Acute on chronic pancreatitis:  We will order p.r.n. Zofran and Dilaudid, NPO on IV fluids.    3. Type 2 diabetes:  Normally takes NPH 25 units b.i.d..  Will order Lantus 10 units daily for now, sliding scale monitor clinically.    4. Hyperlipidemia: Continue Lipitor    5. Depression anxiety: Continue BuSpar, Cymbalta, Neurontin per home  regimen    6. Hypertension:  Now that his blood pressure is high will resume his lisinopril.    7. Hypokalemia:  Place on potassium replacement protocol    8. Chronic marijuana use:  I wonder if it has been considered that he could have cannabis related hyperemesis syndrome.  Would advise cessation of marijuana usage.        Misc    DVT prophylaxis SCD    Code Status: full     The plan was discussed with - Patient

## 2023-07-07 LAB
ALBUMIN SERPL BCG-MCNC: 3.5 G/DL (ref 3.5–5.2)
ALP SERPL-CCNC: 55 U/L (ref 40–129)
ALT SERPL W P-5'-P-CCNC: 53 U/L (ref 0–70)
ANION GAP SERPL CALCULATED.3IONS-SCNC: 11 MMOL/L (ref 7–15)
AST SERPL W P-5'-P-CCNC: 39 U/L (ref 0–45)
BASE EXCESS BLDV CALC-SCNC: 1.9 MMOL/L (ref -7.7–1.9)
BILIRUB SERPL-MCNC: 0.7 MG/DL
BUN SERPL-MCNC: 13.4 MG/DL (ref 6–20)
CALCIUM SERPL-MCNC: 8.6 MG/DL (ref 8.6–10)
CHLORIDE SERPL-SCNC: 104 MMOL/L (ref 98–107)
CREAT SERPL-MCNC: 0.65 MG/DL (ref 0.67–1.17)
DEPRECATED HCO3 PLAS-SCNC: 23 MMOL/L (ref 22–29)
GFR SERPL CREATININE-BSD FRML MDRD: >90 ML/MIN/1.73M2
GLUCOSE BLDC GLUCOMTR-MCNC: 106 MG/DL (ref 70–99)
GLUCOSE BLDC GLUCOMTR-MCNC: 78 MG/DL (ref 70–99)
GLUCOSE BLDC GLUCOMTR-MCNC: 95 MG/DL (ref 70–99)
GLUCOSE BLDC GLUCOMTR-MCNC: 96 MG/DL (ref 70–99)
GLUCOSE BLDC GLUCOMTR-MCNC: 99 MG/DL (ref 70–99)
GLUCOSE SERPL-MCNC: 102 MG/DL (ref 70–99)
HCO3 BLDV-SCNC: 28 MMOL/L (ref 21–28)
HOLD SPECIMEN: NORMAL
O2/TOTAL GAS SETTING VFR VENT: 21 %
PCO2 BLDV: 47 MM HG (ref 40–50)
PH BLDV: 7.38 [PH] (ref 7.32–7.43)
PO2 BLDV: 57 MM HG (ref 25–47)
POTASSIUM SERPL-SCNC: 4.1 MMOL/L (ref 3.4–5.3)
PROT SERPL-MCNC: 6.1 G/DL (ref 6.4–8.3)
SODIUM SERPL-SCNC: 138 MMOL/L (ref 136–145)

## 2023-07-07 PROCEDURE — 250N000011 HC RX IP 250 OP 636: Mod: JZ | Performed by: INTERNAL MEDICINE

## 2023-07-07 PROCEDURE — 258N000003 HC RX IP 258 OP 636: Performed by: INTERNAL MEDICINE

## 2023-07-07 PROCEDURE — 120N000001 HC R&B MED SURG/OB

## 2023-07-07 PROCEDURE — 82803 BLOOD GASES ANY COMBINATION: CPT | Performed by: PEDIATRICS

## 2023-07-07 PROCEDURE — 250N000013 HC RX MED GY IP 250 OP 250 PS 637: Performed by: PEDIATRICS

## 2023-07-07 PROCEDURE — 36415 COLL VENOUS BLD VENIPUNCTURE: CPT | Performed by: PEDIATRICS

## 2023-07-07 PROCEDURE — 80053 COMPREHEN METABOLIC PANEL: CPT | Performed by: INTERNAL MEDICINE

## 2023-07-07 PROCEDURE — 250N000013 HC RX MED GY IP 250 OP 250 PS 637: Performed by: INTERNAL MEDICINE

## 2023-07-07 PROCEDURE — 99232 SBSQ HOSP IP/OBS MODERATE 35: CPT | Performed by: PEDIATRICS

## 2023-07-07 RX ORDER — CALCIUM CARBONATE 500 MG/1
1000 TABLET, CHEWABLE ORAL 3 TIMES DAILY PRN
Status: DISCONTINUED | OUTPATIENT
Start: 2023-07-07 | End: 2023-07-10 | Stop reason: HOSPADM

## 2023-07-07 RX ORDER — OXYCODONE HYDROCHLORIDE 5 MG/1
5 TABLET ORAL EVERY 6 HOURS PRN
Status: ON HOLD | COMMUNITY
Start: 2023-06-30 | End: 2023-07-09

## 2023-07-07 RX ORDER — PANTOPRAZOLE SODIUM 40 MG/1
40 TABLET, DELAYED RELEASE ORAL
Status: DISCONTINUED | OUTPATIENT
Start: 2023-07-07 | End: 2023-07-10 | Stop reason: HOSPADM

## 2023-07-07 RX ADMIN — BUSPIRONE HYDROCHLORIDE 5 MG: 5 TABLET ORAL at 14:22

## 2023-07-07 RX ADMIN — GABAPENTIN 300 MG: 300 CAPSULE ORAL at 14:22

## 2023-07-07 RX ADMIN — PANTOPRAZOLE SODIUM 40 MG: 40 TABLET, DELAYED RELEASE ORAL at 16:08

## 2023-07-07 RX ADMIN — SODIUM CHLORIDE: 9 INJECTION, SOLUTION INTRAVENOUS at 09:12

## 2023-07-07 RX ADMIN — PANTOPRAZOLE SODIUM 40 MG: 40 TABLET, DELAYED RELEASE ORAL at 08:30

## 2023-07-07 RX ADMIN — BUSPIRONE HYDROCHLORIDE 5 MG: 5 TABLET ORAL at 08:30

## 2023-07-07 RX ADMIN — INSULIN GLARGINE 10 UNITS: 100 INJECTION, SOLUTION SUBCUTANEOUS at 08:36

## 2023-07-07 RX ADMIN — GABAPENTIN 300 MG: 300 CAPSULE ORAL at 08:31

## 2023-07-07 RX ADMIN — HYDROMORPHONE HYDROCHLORIDE 0.4 MG: 0.2 INJECTION, SOLUTION INTRAMUSCULAR; INTRAVENOUS; SUBCUTANEOUS at 10:48

## 2023-07-07 RX ADMIN — HYDROMORPHONE HYDROCHLORIDE 0.4 MG: 0.2 INJECTION, SOLUTION INTRAMUSCULAR; INTRAVENOUS; SUBCUTANEOUS at 08:33

## 2023-07-07 RX ADMIN — BUSPIRONE HYDROCHLORIDE 5 MG: 5 TABLET ORAL at 20:51

## 2023-07-07 RX ADMIN — GABAPENTIN 300 MG: 300 CAPSULE ORAL at 20:51

## 2023-07-07 RX ADMIN — LISINOPRIL 10 MG: 10 TABLET ORAL at 08:31

## 2023-07-07 RX ADMIN — HYDROMORPHONE HYDROCHLORIDE 0.4 MG: 0.2 INJECTION, SOLUTION INTRAMUSCULAR; INTRAVENOUS; SUBCUTANEOUS at 01:55

## 2023-07-07 RX ADMIN — ONDANSETRON HYDROCHLORIDE 4 MG: 2 SOLUTION INTRAMUSCULAR; INTRAVENOUS at 01:55

## 2023-07-07 RX ADMIN — DULOXETINE HYDROCHLORIDE 20 MG: 20 CAPSULE, DELAYED RELEASE ORAL at 08:31

## 2023-07-07 RX ADMIN — ATORVASTATIN CALCIUM 20 MG: 10 TABLET, FILM COATED ORAL at 08:30

## 2023-07-07 ASSESSMENT — ACTIVITIES OF DAILY LIVING (ADL)
ADLS_ACUITY_SCORE: 20

## 2023-07-07 NOTE — PLAN OF CARE
"Goal Outcome Evaluation:      Plan of Care Reviewed With: patient    Overall Patient Progress: improvingOverall Patient Progress: improving    Outcome Evaluation: alert and oriented. Independent in room. Changed to clear liquids today, tolerating well. No nausea. Reports abdominal pain, received dilaudid IV twice today. IVF's turned down to 50 ml/hr. Tele SB, DC'd today. TUMS PRN. BG- 96 and 99 today. BP (!) 161/87 (BP Location: Right arm)   Pulse 58   Temp 98.4  F (36.9  C) (Oral)   Resp 12   Ht 1.702 m (5' 7\")   Wt 74.1 kg (163 lb 4.8 oz)   SpO2 97%   BMI 25.58 kg/m          "

## 2023-07-07 NOTE — MEDICATION SCRIBE - ADMISSION MEDICATION HISTORY
Medication Scribe Admission Medication History    Admission medication history is complete. The information provided in this note is only as accurate as the sources available at the time of the update.    Medication reconciliation/reorder completed by provider prior to medication history? Yes    Information Source(s): Patient via in-person    Pertinent Information: Reviewed PTA med list with patient in unit post RN review. Reconfirmed last dosages and reconcile list with patient.     Changes made to PTA medication list:    Added: Oxycodone IR 5 mg as per reconcile list and patient confirmation     Deleted: Dicyclomine 10 mg, patient reported stopped taking due to side effects     Changed: None    Medication Affordability:  Not including over the counter (OTC) medications, was there a time in the past 3 months when you did not take your medications as prescribed because of cost?: No    Allergies reviewed with patient and updates made in EHR: yes    Medication History Completed By: KIM RIBEIRO 7/7/2023 1:27 PM    Prior to Admission medications    Medication Sig Last Dose Taking? Auth Provider Long Term End Date   acetaminophen (TYLENOL) 500 MG tablet Take 1,000 mg by mouth every 8 hours as needed for mild pain Past Week Yes Reported, Patient     atorvastatin (LIPITOR) 20 MG tablet Take 1 tablet (20 mg) by mouth daily 7/5/2023 at am Yes Nahun Clemente MD Yes    busPIRone (BUSPAR) 5 MG tablet Take 1 tablet (5 mg) by mouth 3 times daily 7/5/2023 at hs Yes Nahun Clemente MD Yes    DULoxetine (CYMBALTA) 20 MG capsule Take 1 capsule (20 mg) by mouth daily 7/5/2023 at am Yes Nahun Clemente MD Yes    gabapentin (NEURONTIN) 300 MG capsule Take 1 capsule (300 mg) by mouth 3 times daily 7/5/2023 at hs Yes Nahun Clemente MD Yes    insulin NPH (NOVOLIN N VIAL) 100 UNIT/ML vial Inject 25 Units Subcutaneous 2 times daily 7/5/2023 at hs Yes Nahun Clemente MD Yes    lisinopril (ZESTRIL) 10 MG tablet  Take 1 tablet (10 mg) by mouth daily 7/5/2023 at am Yes Nahnu Clemente MD Yes    omeprazole (PRILOSEC) 20 MG DR capsule Take 1 capsule (20 mg) by mouth 2 times daily as needed (heartburn) 7/5/2023 at am Yes Nahun Clemente MD     ondansetron (ZOFRAN ODT) 8 MG ODT tab Dissolve 1 tablet in mouth every 8 to 12 hours as needed for nausea or vomiting Past Week Yes Nahun Clemente MD     oxyCODONE (ROXICODONE) 5 MG tablet Take 5 mg by mouth every 6 hours as needed Past Week Yes Reported, Patient     blood glucose (ONETOUCH VERIO IQ) test strip 1 strip by Other route 7/5/2023 at hs  Reported, Patient     blood glucose (ONETOUCH VERIO IQ) test strip Use every morning as directed to measure blood sugar   Reported, Patient     metoclopramide (REGLAN) 5 MG tablet Take 1 tablet (5 mg) by mouth 3 times daily as needed (with meals as needed) 7/5/2023 at am  Nahun Clemente MD     omeprazole (PRILOSEC) 40 MG DR capsule Take 1 capsule (40 mg) by mouth daily   Nahun Clemente MD     prochlorperazine (COMPAZINE) 10 MG tablet Take 1 tablet (10 mg) by mouth every 6 hours as needed for nausea or vomiting   Jed Fam MD

## 2023-07-07 NOTE — PROGRESS NOTES
[facility]    Medicine Progress Note - Hospitalist Service    Date of Admission:  7/5/2023    Assessment & Plan   37-year-old man with type 2 diabetes treated with insulin, hypertension, history of chronic pancreatitis, and remote history of cholecystectomy who presented to ER after syncopal event and was found to be severely hypotensive.  Due to concerns for refractory hypotension and acute on chronic pancreatitis, he was admitted.    Probable hypovolemic shock with elevated lactic acid, acute renal failure, and syncope  Presented with severe hypotension that persisted for over 5 hours in the ER and did not improve until he had received 5 L IV fluid resuscitation.  Severe hypotension was associated with acute renal failure and elevated lactic acid level as well as syncope raising concern for shock.  Severe hypotension was probably multifactorial including hypovolemia after repeated vomiting but also the effect of chronic lisinopril treatment (which he has been previously prescribed to treat hypertension) and adverse effect of risperidone which he took yesterday (but which had not been prescribed for him).  After extensive IV fluid resuscitation, hypotension has resolved and he did not require initiation of vasopressor therapy.  Other signs of shock have also resolved including recovery of lactic acid and renal function to normal and he has not had any recurrent episodes of syncope.  Although there was concern for possible infection initially, infection has not been identified nor is it suspected, so sepsis and septic shock have been ruled out.  Clinical course has not been worrisome for cardiogenic shock.  -Reduce IV fluid rate today    Acute on chronic pancreatitis  Presented with recent history of worsening nausea and vomiting and did develop onset of abdominal pain in the ER.  Patient reporting current symptoms are similar to symptoms he has had with his episodes of pancreatitis  in the past, and he reports that he has been diagnosed with chronic pancreatitis.  Recent abdomen CT performed June 29 had demonstrated radiographic findings suspicious for chronic pancreatitis.  Lipase was mildly elevated initially which could have been due to acute pancreatitis in a patient who has chronic pancreatitis, but lipase has since normalized.  He continues to have intermittent abdominal pain suspicious for pancreatitis, but symptoms are significantly improved and he presently does not have any abdominal pain or tenderness.  -Start clear liquids today as tolerated, not anticipating to advance diet beyond clear liquids today  -Continue symptomatic treatment of pain with IV Dilaudid as needed  -Continue symptomatic treatment of nausea with antiemetics as needed    Intractable nausea and vomiting  Cannabis use  GI symptoms are probably due at least partly to acute on chronic pancreatitis.  However, he also uses cannabis and could have cannabinoid hyperemesis syndrome.  Nausea and vomiting have resolved.  Discussed cannabinoid hyperemesis syndrome with patient during hospitalization and advised abstinence from use of cannabis.  -Advancing to clear liquid diet today  -Continue symptomatic treatment with antiemetics as needed    Hypokalemia  Hypophosphatemia  Presented with potassium 3.3 and had phosphorus 2.4 most probably due to recent vomiting and poor oral intake.  Hypokalemia and hypophosphatemia could contribute to generalized malaise.  Potassium and phosphorus have normalized after supplementation.  -Continue phosphorus supplementation per standard protocol as indicated  -Monitor potassium    Hypertension  Chronically takes lisinopril to treat hypertension.  Had taken lisinopril July 5.  Presented with severe hypotension but after aggressive fluid resuscitation treatment in the ER became persistently hypertensive which improved after restarting lisinopril.  He has not had recurrent hypotension after  restarting lisinopril during hospitalization.  -Continue chronic dose of lisinopril    Type 2 diabetes treated with insulin  Chronically uses insulin to treat diabetes with most recent hemoglobin A1c 6.6 on June 29.  Blood sugars adequately controlled during hospitalization but has not yet advanced diet.  -Monitor blood sugars 4 times daily as diet advances  -Continue medium NovoLog insulin correction scale before meals  -Continue lower daily basal insulin dose 10 units glargine insulin until diet further advances (compared with chronic basal insulin dosing of 25 units NPH insulin twice daily)    Anxiety and depression  Chronically taking duloxetine, buspirone, and gabapentin with previous diagnoses of anxiety and depression which appear to be generally stable although are difficult to assess reliably because of acute illness.  -Continue chronic doses of duloxetine, buspirone, and gabapentin       Diet: NPO for Medical/Clinical Reasons Except for: Meds    DVT Prophylaxis: Pneumatic Compression Devices  Watson Catheter: Not present  Lines: None     Cardiac Monitoring: ACTIVE order. Indication: Syncope- high cardiac risk (48 hours)  Code Status: Full Code      Clinically Significant Risk Factors                        Disposition Plan   Anticipate discharge home once medically stable, possibly 1 to 2 days          Tanmay Morgan MD  Hospitalist Service  [facility]  Securely message with [employer] (more info)  Text page via [employer] Paging/Directory   ______________________________________________________________________    Interval History   There were no significant overnight events.  Patient says he had some burning in his chest and throat that was relieved with a dose of IV Dilaudid.  He sometimes takes an antacid at home for heartburn.  He also had an episode of upper mid and left-sided abdominal pain which is similar to the pain that he normally gets with pancreatitis.  That  pancreatitis pain episode also resolved after 1 dose IV Dilaudid.  He has not yet tried to advance diet at all but is hungry today.  He denies any nausea and has not had any vomiting in the last day.  He generally feels better today.  He offers no new complaints.  He continues to be afebrile and hemodynamically stable.  Blood pressure normalized during sleep but again is elevated today after waking.  Oxygenation remains normal.  He is voiding spontaneously with good urine output.    Physical Exam   Vital Signs: Temp: 98.4  F (36.9  C) Temp src: Oral BP: (!) 161/87 Pulse: 58   Resp: 12 SpO2: 97 % O2 Device: None (Room air)     Patient Vitals for the past 24 hrs:   BP Temp Temp src Pulse Resp SpO2   07/07/23 0748 (!) 161/87 98.4  F (36.9  C) Oral 58 12 97 %   07/06/23 2348 125/79 97.7  F (36.5  C) Oral 50 18 97 %   07/06/23 1516 137/84 98.5  F (36.9  C) Oral 57 16 99 %   07/06/23 1400 -- -- -- 55 16 --       Intake/Output Summary (Last 24 hours) at 7/7/2023 1325  Last data filed at 7/7/2023 0750  Gross per 24 hour   Intake 435 ml   Output 1100 ml   Net -665 ml     General Appearance: No acute distress sitting up in bed  Respiratory: Normal respiratory effort, clear lungs  Cardiovascular: Regular rate and rhythm  GI: Hypoactive bowel sounds, nondistended abdomen, soft, no abdominal tenderness  Other: Alert and maintains wakefulness and attention    Medical Decision Making           Data     I have personally reviewed the following data over the past 24 hrs:    N/A  \   N/A   / N/A     138 104 13.4 /  99   4.1 23 0.65 (L) \       ALT: 53 AST: 39 AP: 55 TBILI: 0.7   ALB: 3.5 TOT PROTEIN: 6.1 (L) LIPASE: N/A        Blood cultures are negative so far    Venous Blood Gas  Recent Labs   Lab 07/07/23  0519 07/05/23  2135   PHV 7.38 7.36   PCO2V 47 56*   PO2V 57* 30   HCO3V 28 31*   SANDRA 1.9 3.7*   O2PER 21 21     Cardiac monitor results reviewed over the past 24 hrs: Normal sinus rhythm and sinus bradycardia but no  arrhythmias    Recent Labs   Lab 07/07/23  1138 07/07/23  0752 07/07/23 0519 07/06/23  1139 07/06/23  0832 07/05/23 2247 07/05/23  2506   WBC  --   --   --   --  11.8*  --  11.2*   HGB  --   --   --   --  14.6  --  14.9   MCV  --   --   --   --  93  --  92   PLT  --   --   --   --  231  --  369   NA  --   --  138  --  141  --  141   POTASSIUM  --   --  4.1  --  4.0  --  3.3*   CHLORIDE  --   --  104  --  107  --  101   CO2  --   --  23  --  20*  --  28   BUN  --   --  13.4  --  14.8  --  24.6*   CR  --   --  0.65*  --  0.68  --  1.38*   ANIONGAP  --   --  11  --  14  --  12   IRMA  --   --  8.6  --  8.5*  --  9.5   GLC 99 96 102*   < > 161*   < > 125*   ALBUMIN  --   --  3.5  --   --   --  4.2   PROTTOTAL  --   --  6.1*  --   --   --  6.9   BILITOTAL  --   --  0.7  --   --   --  0.7   ALKPHOS  --   --  55  --   --   --  61   ALT  --   --  53  --   --   --  26   AST  --   --  39  --   --   --  23   LIPASE  --   --   --   --  43  --  108*    < > = values in this interval not displayed.

## 2023-07-08 LAB
ANION GAP SERPL CALCULATED.3IONS-SCNC: 15 MMOL/L (ref 7–15)
BUN SERPL-MCNC: 12.4 MG/DL (ref 6–20)
CALCIUM SERPL-MCNC: 9.3 MG/DL (ref 8.6–10)
CHLORIDE SERPL-SCNC: 101 MMOL/L (ref 98–107)
CREAT SERPL-MCNC: 0.75 MG/DL (ref 0.67–1.17)
DEPRECATED HCO3 PLAS-SCNC: 25 MMOL/L (ref 22–29)
GFR SERPL CREATININE-BSD FRML MDRD: >90 ML/MIN/1.73M2
GLUCOSE BLDC GLUCOMTR-MCNC: 117 MG/DL (ref 70–99)
GLUCOSE BLDC GLUCOMTR-MCNC: 129 MG/DL (ref 70–99)
GLUCOSE BLDC GLUCOMTR-MCNC: 148 MG/DL (ref 70–99)
GLUCOSE BLDC GLUCOMTR-MCNC: 173 MG/DL (ref 70–99)
GLUCOSE BLDC GLUCOMTR-MCNC: 176 MG/DL (ref 70–99)
GLUCOSE BLDC GLUCOMTR-MCNC: 234 MG/DL (ref 70–99)
GLUCOSE SERPL-MCNC: 127 MG/DL (ref 70–99)
LIPASE SERPL-CCNC: 106 U/L (ref 13–60)
PHOSPHATE SERPL-MCNC: 2.7 MG/DL (ref 2.5–4.5)
POTASSIUM SERPL-SCNC: 4.3 MMOL/L (ref 3.4–5.3)
SODIUM SERPL-SCNC: 141 MMOL/L (ref 136–145)

## 2023-07-08 PROCEDURE — 258N000003 HC RX IP 258 OP 636: Performed by: PEDIATRICS

## 2023-07-08 PROCEDURE — 84100 ASSAY OF PHOSPHORUS: CPT | Performed by: PEDIATRICS

## 2023-07-08 PROCEDURE — 83690 ASSAY OF LIPASE: CPT | Performed by: PEDIATRICS

## 2023-07-08 PROCEDURE — 250N000011 HC RX IP 250 OP 636: Performed by: INTERNAL MEDICINE

## 2023-07-08 PROCEDURE — 120N000001 HC R&B MED SURG/OB

## 2023-07-08 PROCEDURE — 250N000013 HC RX MED GY IP 250 OP 250 PS 637: Performed by: PEDIATRICS

## 2023-07-08 PROCEDURE — 250N000013 HC RX MED GY IP 250 OP 250 PS 637: Performed by: INTERNAL MEDICINE

## 2023-07-08 PROCEDURE — 99232 SBSQ HOSP IP/OBS MODERATE 35: CPT | Performed by: PEDIATRICS

## 2023-07-08 PROCEDURE — 80048 BASIC METABOLIC PNL TOTAL CA: CPT | Performed by: PEDIATRICS

## 2023-07-08 PROCEDURE — 36415 COLL VENOUS BLD VENIPUNCTURE: CPT | Performed by: PEDIATRICS

## 2023-07-08 RX ORDER — LORAZEPAM 0.5 MG/1
0.5 TABLET ORAL EVERY 4 HOURS PRN
Status: DISCONTINUED | OUTPATIENT
Start: 2023-07-08 | End: 2023-07-10 | Stop reason: HOSPADM

## 2023-07-08 RX ORDER — BUSPIRONE HYDROCHLORIDE 5 MG/1
10 TABLET ORAL 3 TIMES DAILY
Status: DISCONTINUED | OUTPATIENT
Start: 2023-07-08 | End: 2023-07-10 | Stop reason: HOSPADM

## 2023-07-08 RX ADMIN — LISINOPRIL 10 MG: 10 TABLET ORAL at 08:14

## 2023-07-08 RX ADMIN — SODIUM CHLORIDE: 9 INJECTION, SOLUTION INTRAVENOUS at 20:45

## 2023-07-08 RX ADMIN — BUSPIRONE HYDROCHLORIDE 10 MG: 5 TABLET ORAL at 20:26

## 2023-07-08 RX ADMIN — ONDANSETRON 4 MG: 4 TABLET, ORALLY DISINTEGRATING ORAL at 04:59

## 2023-07-08 RX ADMIN — BUSPIRONE HYDROCHLORIDE 5 MG: 5 TABLET ORAL at 08:14

## 2023-07-08 RX ADMIN — BUSPIRONE HYDROCHLORIDE 10 MG: 5 TABLET ORAL at 13:34

## 2023-07-08 RX ADMIN — HYDROMORPHONE HYDROCHLORIDE 0.4 MG: 0.2 INJECTION, SOLUTION INTRAMUSCULAR; INTRAVENOUS; SUBCUTANEOUS at 05:04

## 2023-07-08 RX ADMIN — SODIUM CHLORIDE: 9 INJECTION, SOLUTION INTRAVENOUS at 05:00

## 2023-07-08 RX ADMIN — DULOXETINE HYDROCHLORIDE 20 MG: 20 CAPSULE, DELAYED RELEASE ORAL at 08:14

## 2023-07-08 RX ADMIN — LORAZEPAM 0.5 MG: 0.5 TABLET ORAL at 09:34

## 2023-07-08 RX ADMIN — GABAPENTIN 300 MG: 300 CAPSULE ORAL at 08:14

## 2023-07-08 RX ADMIN — GABAPENTIN 300 MG: 300 CAPSULE ORAL at 13:34

## 2023-07-08 RX ADMIN — INSULIN GLARGINE 10 UNITS: 100 INJECTION, SOLUTION SUBCUTANEOUS at 07:31

## 2023-07-08 RX ADMIN — PANTOPRAZOLE SODIUM 40 MG: 40 TABLET, DELAYED RELEASE ORAL at 16:59

## 2023-07-08 RX ADMIN — PANTOPRAZOLE SODIUM 40 MG: 40 TABLET, DELAYED RELEASE ORAL at 07:31

## 2023-07-08 RX ADMIN — GABAPENTIN 300 MG: 300 CAPSULE ORAL at 20:26

## 2023-07-08 RX ADMIN — ATORVASTATIN CALCIUM 20 MG: 10 TABLET, FILM COATED ORAL at 08:14

## 2023-07-08 ASSESSMENT — ACTIVITIES OF DAILY LIVING (ADL)
ADLS_ACUITY_SCORE: 20

## 2023-07-08 NOTE — PLAN OF CARE
Goal Outcome Evaluation:    Plan of Care Reviewed With: patient    Overall Patient Progress: improving    Outcome Evaluation: Pt's VSS. Afebrile. Up independently. At 0400, pt awoke reporting abdominal discomfort. Pt requested a shower. After shower, pt reported increased abdominal pain and nausea. Given zofran and dialudid with some relief.

## 2023-07-08 NOTE — PROGRESS NOTES
[facility]    Medicine Progress Note - Hospitalist Service    Date of Admission:  7/5/2023    Assessment & Plan   37-year-old man with type 2 diabetes treated with insulin, hypertension, history of chronic pancreatitis, and remote history of cholecystectomy who presented to ER after syncopal event and was found to be severely hypotensive.  Due to concerns for refractory hypotension and acute on chronic pancreatitis, he was admitted.    Probable hypovolemic shock with elevated lactic acid, acute renal failure, and syncope  Presented with severe hypotension that persisted for over 5 hours in the ER and did not improve until he had received 5 L IV fluid resuscitation.  Severe hypotension was associated with acute renal failure and elevated lactic acid level as well as syncope raising concern for shock.  Severe hypotension was probably multifactorial including hypovolemia after repeated vomiting but also the effect of chronic lisinopril treatment (which he has been previously prescribed to treat hypertension) and adverse effect of risperidone which he took on 7/5 (but which had not been prescribed for him).  After extensive IV fluid resuscitation, hypotension resolved and he did not require initiation of vasopressor therapy.  Other signs of shock have also resolved including recovery of lactic acid and renal function to normal and he has not had any recurrent episodes of syncope.  Although there was concern for possible infection initially, infection has not been identified nor is it suspected, so sepsis and septic shock have been ruled out.  Clinical course has not been worrisome for cardiogenic shock.  -Discontinue IV fluids today as long as blood pressure remains stable    Acute on chronic pancreatitis  Presented with recent history of worsening nausea and vomiting and did develop onset of abdominal pain in the ER.  Patient reporting current symptoms are similar to symptoms he has had  with his episodes of pancreatitis in the past, and he reports that he has been diagnosed with chronic pancreatitis.  Recent abdomen CT performed June 29 had demonstrated radiographic findings suspicious for chronic pancreatitis.  Lipase was mildly elevated at admission which could have been due to acute pancreatitis in a patient who has chronic pancreatitis, but lipase subsequently normalized.  He continues to have intermittent abdominal pain suspicious for pancreatitis and today has epigastric tenderness.  -Continue restricted clear liquid diet today, not anticipating to advance diet beyond clear liquids today  -Continue symptomatic treatment of pain with IV Dilaudid as needed  -Continue symptomatic treatment of nausea with antiemetics as needed  -Ordered recheck of lipase    Intractable nausea and vomiting  Cannabis use  GI symptoms are probably due at least partly to acute on chronic pancreatitis.  However, gastroduodenitis is possible and can cause abdominal discomfort as well as nausea and vomiting.  Additionally, he uses cannabis and could have cannabinoid hyperemesis syndrome.  Nausea and vomiting initially resolved, but nausea recurred early a.m. today 7/8.  He was started on scheduled twice daily dose of Protonix on 7/7.  Discussed cannabinoid hyperemesis syndrome with patient during hospitalization and advised abstinence from use of cannabis.  -Continuing clear liquid diet today  -Continue symptomatic treatment with antiemetics as needed    Hypokalemia  Hypophosphatemia  Presented with potassium 3.3 and had phosphorus 2.4 most probably due to recent vomiting and poor oral intake.  Hypokalemia and hypophosphatemia could contribute to generalized malaise.  Potassium and phosphorus normalized after supplementation.  -Continue phosphorus supplementation per standard protocol as indicated  -Monitor potassium    Hypertension  Chronically takes lisinopril to treat hypertension.  Had taken lisinopril July 5.   Presented with severe hypotension but after aggressive fluid resuscitation treatment in the ER became persistently hypertensive which improved after restarting lisinopril.  He has not had recurrent hypotension after restarting lisinopril during hospitalization.  -Continue chronic dose of lisinopril    Type 2 diabetes treated with insulin  Chronically uses insulin to treat diabetes with most recent hemoglobin A1c 6.6 on June 29.  Blood sugars adequately controlled during hospitalization but has not yet fully advanced diet.  -Monitor blood sugars 4 times daily as diet advances  -Continue medium NovoLog insulin correction scale before meals  -Continue lower daily basal insulin dose 10 units glargine insulin until diet further advances (compared with chronic basal insulin dosing of 25 units NPH insulin twice daily)    Anxiety and depression  Chronically taking duloxetine, buspirone, and gabapentin with previous diagnoses of anxiety and depression which had been stable although have been difficult to assess reliably because of acute illness.  He clearly has worsening anxiety today 7/8.  He does not demonstrate other -overt clinical signs of serotonin syndrome.  -Continue chronic doses of duloxetine and gabapentin  -Increase buspirone dose from 5 mg to 10 mg 3 times daily  -Add oral lorazepam 0.5 milligram every 4 hours as needed for anxiety         Diet: Clear Liquid Diet    DVT Prophylaxis: Low Risk/Ambulatory with no VTE prophylaxis indicated  Watson Catheter: Not present  Lines: None     Cardiac Monitoring: None  Code Status: Full Code      Clinically Significant Risk Factors                                Disposition Plan      Expected Discharge Date: 07/09/2023      Destination: home  Discharge Comments: Not medically ready.          Tanmay Morgan MD  Hospitalist Service  [facility]  Securely message with [employer] (more info)  Text page via [employer] Paging/Directory    ______________________________________________________________________    Interval History   He is feeling worse today.  He says he feels shaky and anxious.  Current symptoms are similar to symptoms he has had with panic attacks in the past although not as severe.  He has been drinking clear liquids and did not notice increasing abdominal pain or nausea immediately after drinking them.  However, he awoke at 4 AM today and had increasing nausea and abdominal pain that improved after a dose of IV Dilaudid and antiemetics.  He does not have a sense of hunger or appetite.  He remains afebrile.  He has been bradycardic.  Blood pressure has been elevated.  Oxygenation has been normal.  He has had brisk urine output.  He has not had a bowel movement.    Physical Exam   Vital Signs: Temp: 98  F (36.7  C) Temp src: Oral BP: (!) 153/90 Pulse: 59   Resp: 16 SpO2: 98 % O2 Device: None (Room air)     Patient Vitals for the past 24 hrs:   BP Temp Temp src Pulse Resp SpO2 Weight   07/08/23 0810 (!) 153/90 98  F (36.7  C) Oral 59 -- -- --   07/08/23 0457 (!) 159/82 98.4  F (36.9  C) -- 59 16 98 % --   07/08/23 0207 -- -- -- -- -- -- 71.9 kg (158 lb 9.6 oz)   07/07/23 2246 (!) 146/93 98.3  F (36.8  C) Oral 51 18 98 % --   07/07/23 1532 (!) 152/93 98.2  F (36.8  C) Oral 52 19 98 % --     Weight: 158 lbs 9.6 oz   Vitals:    07/05/23 2117 07/06/23 0643 07/08/23 0207   Weight: 73 kg (161 lb) 74.1 kg (163 lb 4.8 oz) 71.9 kg (158 lb 9.6 oz)       Intake/Output Summary (Last 24 hours) at 7/8/2023 0931  Last data filed at 7/8/2023 0500  Gross per 24 hour   Intake 2909 ml   Output 3300 ml   Net -391 ml     General Appearance: Anxious appearing man while lying in bed without other signs of acute distress  Respiratory: Normal respiratory effort, clear lungs  Cardiovascular: Slow but regular heart rate and rhythm, good radial pulse, normal capillary refill  GI: Nondistended abdomen, hypoactive bowel sounds, soft, epigastric tenderness present  without peritoneal signs  Skin: No rash  Neuro: Alert and maintains wakefulness and attention, no tremor evident, appears anxious    Medical Decision Making           Data     I have personally reviewed the following data over the past 24 hrs:    N/A  \   N/A   / N/A     141 101 12.4 /  148 (H)   4.3 25 0.75 \       ALT: N/A AST: N/A AP: N/A TBILI: N/A   ALB: N/A TOT PROTEIN: N/A LIPASE: 106 (H)        Blood sugars range  over the last day  Phosphorus 2.7  Blood cultures are negative so far    Recent Labs   Lab 07/08/23  0733 07/08/23  0536 07/08/23  0459 07/07/23  0752 07/07/23  0519 07/06/23  1139 07/06/23  0832 07/05/23  2247 07/05/23  2126   WBC  --   --   --   --   --   --  11.8*  --  11.2*   HGB  --   --   --   --   --   --  14.6  --  14.9   MCV  --   --   --   --   --   --  93  --  92   PLT  --   --   --   --   --   --  231  --  369   NA  --  141  --   --  138  --  141  --  141   POTASSIUM  --  4.3  --   --  4.1  --  4.0  --  3.3*   CHLORIDE  --  101  --   --  104  --  107  --  101   CO2  --  25  --   --  23  --  20*  --  28   BUN  --  12.4  --   --  13.4  --  14.8  --  24.6*   CR  --  0.75  --   --  0.65*  --  0.68  --  1.38*   ANIONGAP  --  15  --   --  11  --  14  --  12   IRMA  --  9.3  --   --  8.6  --  8.5*  --  9.5   * 127* 129*   < > 102*   < > 161*   < > 125*   ALBUMIN  --   --   --   --  3.5  --   --   --  4.2   PROTTOTAL  --   --   --   --  6.1*  --   --   --  6.9   BILITOTAL  --   --   --   --  0.7  --   --   --  0.7   ALKPHOS  --   --   --   --  55  --   --   --  61   ALT  --   --   --   --  53  --   --   --  26   AST  --   --   --   --  39  --   --   --  23   LIPASE  --  106*  --   --   --   --  43  --  108*    < > = values in this interval not displayed.

## 2023-07-08 NOTE — PLAN OF CARE
Goal Outcome Evaluation:      Plan of Care Reviewed With: patient    Overall Patient Progress: improvingOverall Patient Progress: improving    Outcome Evaluation: patient A&O, vss, room air, tele d/c'd, denies pain, BG 78 before meal

## 2023-07-08 NOTE — PLAN OF CARE
"Goal Outcome Evaluation:      Plan of Care Reviewed With: patient          Outcome Evaluation: Alert and oriented x 4. Vitals are stable on RA. Denies pin, nausea and vomiting. Up independently in the room. Complained of some anxiety this morning. PRN ativan administered with relief. Blood sugars 148, 234, 178 SS insulin administered per MAR. Clear liquid diet with fair intake. PIV running NS at 50 mL/hr. Electrolyes in range. Redraw in the AM.  BP (!) 144/98 (BP Location: Right arm)   Pulse 64   Temp 99.2  F (37.3  C) (Oral)   Resp 17   Ht 1.702 m (5' 7\")   Wt 71.9 kg (158 lb 9.6 oz)   SpO2 98%   BMI 24.84 kg/m          "

## 2023-07-09 PROBLEM — R74.8 ELEVATED LIPASE: Status: ACTIVE | Noted: 2023-07-09

## 2023-07-09 LAB
ANION GAP SERPL CALCULATED.3IONS-SCNC: 9 MMOL/L (ref 7–15)
CHLORIDE SERPL-SCNC: 103 MMOL/L (ref 98–107)
DEPRECATED HCO3 PLAS-SCNC: 26 MMOL/L (ref 22–29)
GLUCOSE BLDC GLUCOMTR-MCNC: 110 MG/DL (ref 70–99)
GLUCOSE BLDC GLUCOMTR-MCNC: 159 MG/DL (ref 70–99)
GLUCOSE BLDC GLUCOMTR-MCNC: 189 MG/DL (ref 70–99)
GLUCOSE BLDC GLUCOMTR-MCNC: 222 MG/DL (ref 70–99)
GLUCOSE BLDC GLUCOMTR-MCNC: 229 MG/DL (ref 70–99)
GLUCOSE BLDC GLUCOMTR-MCNC: 72 MG/DL (ref 70–99)
LIPASE SERPL-CCNC: 140 U/L (ref 13–60)
PHOSPHATE SERPL-MCNC: 2.8 MG/DL (ref 2.5–4.5)
POTASSIUM SERPL-SCNC: 4.1 MMOL/L (ref 3.4–5.3)
POTASSIUM SERPL-SCNC: 4.1 MMOL/L (ref 3.4–5.3)
SODIUM SERPL-SCNC: 138 MMOL/L (ref 136–145)

## 2023-07-09 PROCEDURE — 99233 SBSQ HOSP IP/OBS HIGH 50: CPT | Performed by: PEDIATRICS

## 2023-07-09 PROCEDURE — 36415 COLL VENOUS BLD VENIPUNCTURE: CPT | Performed by: PEDIATRICS

## 2023-07-09 PROCEDURE — 80051 ELECTROLYTE PANEL: CPT | Performed by: PEDIATRICS

## 2023-07-09 PROCEDURE — 250N000013 HC RX MED GY IP 250 OP 250 PS 637: Performed by: INTERNAL MEDICINE

## 2023-07-09 PROCEDURE — 83690 ASSAY OF LIPASE: CPT | Performed by: PEDIATRICS

## 2023-07-09 PROCEDURE — 250N000012 HC RX MED GY IP 250 OP 636 PS 637: Performed by: PEDIATRICS

## 2023-07-09 PROCEDURE — 120N000001 HC R&B MED SURG/OB

## 2023-07-09 PROCEDURE — 250N000013 HC RX MED GY IP 250 OP 250 PS 637: Performed by: PEDIATRICS

## 2023-07-09 PROCEDURE — 84100 ASSAY OF PHOSPHORUS: CPT | Performed by: PEDIATRICS

## 2023-07-09 RX ORDER — CALCIUM CARBONATE 500 MG/1
2 TABLET, CHEWABLE ORAL 3 TIMES DAILY PRN
COMMUNITY
Start: 2023-07-09

## 2023-07-09 RX ORDER — BUSPIRONE HYDROCHLORIDE 5 MG/1
10 TABLET ORAL 3 TIMES DAILY
Qty: 90 TABLET | Refills: 3 | COMMUNITY
Start: 2023-07-09

## 2023-07-09 RX ORDER — PANTOPRAZOLE SODIUM 40 MG/1
40 TABLET, DELAYED RELEASE ORAL
Qty: 60 TABLET | Refills: 1 | Status: SHIPPED | OUTPATIENT
Start: 2023-07-09

## 2023-07-09 RX ORDER — LORAZEPAM 0.5 MG/1
0.5 TABLET ORAL EVERY 4 HOURS PRN
Qty: 6 TABLET | Refills: 0 | Status: SHIPPED | OUTPATIENT
Start: 2023-07-09

## 2023-07-09 RX ORDER — OXYCODONE HYDROCHLORIDE 5 MG/1
5 TABLET ORAL EVERY 6 HOURS PRN
Qty: 6 TABLET | Refills: 0 | Status: SHIPPED | OUTPATIENT
Start: 2023-07-09

## 2023-07-09 RX ADMIN — CALCIUM CARBONATE (ANTACID) CHEW TAB 500 MG 1000 MG: 500 CHEW TAB at 13:13

## 2023-07-09 RX ADMIN — GABAPENTIN 300 MG: 300 CAPSULE ORAL at 20:33

## 2023-07-09 RX ADMIN — BUSPIRONE HYDROCHLORIDE 10 MG: 5 TABLET ORAL at 08:14

## 2023-07-09 RX ADMIN — BUSPIRONE HYDROCHLORIDE 10 MG: 5 TABLET ORAL at 13:13

## 2023-07-09 RX ADMIN — INSULIN GLARGINE 10 UNITS: 100 INJECTION, SOLUTION SUBCUTANEOUS at 08:10

## 2023-07-09 RX ADMIN — BUSPIRONE HYDROCHLORIDE 10 MG: 5 TABLET ORAL at 20:33

## 2023-07-09 RX ADMIN — HUMAN INSULIN 5 UNITS: 100 INJECTION, SOLUTION SUBCUTANEOUS at 12:48

## 2023-07-09 RX ADMIN — ATORVASTATIN CALCIUM 20 MG: 10 TABLET, FILM COATED ORAL at 08:13

## 2023-07-09 RX ADMIN — DULOXETINE HYDROCHLORIDE 20 MG: 20 CAPSULE, DELAYED RELEASE ORAL at 08:14

## 2023-07-09 RX ADMIN — LISINOPRIL 10 MG: 10 TABLET ORAL at 08:14

## 2023-07-09 RX ADMIN — CALCIUM CARBONATE (ANTACID) CHEW TAB 500 MG 1000 MG: 500 CHEW TAB at 09:48

## 2023-07-09 RX ADMIN — INSULIN HUMAN 25 UNITS: 100 INJECTION, SUSPENSION SUBCUTANEOUS at 16:47

## 2023-07-09 RX ADMIN — PANTOPRAZOLE SODIUM 40 MG: 40 TABLET, DELAYED RELEASE ORAL at 16:46

## 2023-07-09 RX ADMIN — GABAPENTIN 300 MG: 300 CAPSULE ORAL at 08:13

## 2023-07-09 RX ADMIN — GABAPENTIN 300 MG: 300 CAPSULE ORAL at 13:13

## 2023-07-09 RX ADMIN — PANTOPRAZOLE SODIUM 40 MG: 40 TABLET, DELAYED RELEASE ORAL at 06:08

## 2023-07-09 RX ADMIN — HUMAN INSULIN 5 UNITS: 100 INJECTION, SOLUTION SUBCUTANEOUS at 18:33

## 2023-07-09 ASSESSMENT — ACTIVITIES OF DAILY LIVING (ADL)
ADLS_ACUITY_SCORE: 20
ADLS_ACUITY_SCORE: 18
ADLS_ACUITY_SCORE: 20
ADLS_ACUITY_SCORE: 18
ADLS_ACUITY_SCORE: 18
ADLS_ACUITY_SCORE: 20
ADLS_ACUITY_SCORE: 18
ADLS_ACUITY_SCORE: 20
ADLS_ACUITY_SCORE: 20

## 2023-07-09 NOTE — PLAN OF CARE
Goal Outcome Evaluation:      Plan of Care Reviewed With: patient    Overall Patient Progress: improvingOverall Patient Progress: improving    Outcome Evaluation: Patient is A&Ox4. Denies pain today. Switched from clears to regular diet. Indigestion after meals, tums given. IV SL. BM today. Lungs are clear. Plan to discharge home tomorrow.

## 2023-07-09 NOTE — PLAN OF CARE
"Goal Outcome Evaluation:      Plan of Care Reviewed With: patient    Overall Patient Progress: improvingOverall Patient Progress: improving    Outcome Evaluation: Pt is A&O x4. Ambulating independently to BR.  and    . Denies pain at this time. IVF NS @ 50/ hr. Tolerating clear diet well with no nausea or vomitting. LSC on RA. Bowel sounds active. BP (!) 140/86 (BP Location: Right arm)   Pulse 60   Temp 98.8  F (37.1  C) (Oral)   Resp 16   Ht 1.702 m (5' 7\")   Wt 71.9 kg (158 lb 9.6 oz)   SpO2 98%   BMI 24.84 kg/m  .          "

## 2023-07-09 NOTE — PROGRESS NOTES
[facility]    Medicine Progress Note - Hospitalist Service    Date of Admission:  7/5/2023    Assessment & Plan   37-year-old man with type 2 diabetes treated with insulin, hypertension, history of chronic pancreatitis, and remote history of cholecystectomy who presented to ER after syncopal event and was found to be severely hypotensive.  Due to concerns for refractory hypotension and acute on chronic pancreatitis, he was admitted.    Probable hypovolemic shock with elevated lactic acid, acute renal failure, and syncope  Presented with severe hypotension that persisted for over 5 hours in the ER and did not improve until he had received 5 L IV fluid resuscitation.  Severe hypotension was associated with acute renal failure and elevated lactic acid level as well as syncope raising concern for shock.  Severe hypotension was probably multifactorial including hypovolemia after repeated vomiting but also the effect of chronic lisinopril treatment (which he has been previously prescribed to treat hypertension) and adverse effect of risperidone which he took on 7/5 (but which had not been prescribed for him).  After extensive IV fluid resuscitation, hypotension resolved and he did not require initiation of vasopressor therapy.  Other signs of shock have also resolved including recovery of lactic acid and renal function to normal and he has not had any recurrent episodes of syncope.  Although there was concern for possible infection initially, infection has not been identified nor is it suspected, so sepsis and septic shock have been ruled out.  Clinical course has not been worrisome for cardiogenic shock.  -Monitor clinically    Upper abdominal pain, suspect gastroduodenitis  Intractable nausea and vomiting  Elevated lipase  Chronic pancreatitis  Cannabis use  Presented with recent history of worsening nausea and vomiting and had abdominal pain in the ER.  Patient reporting current symptoms  are similar to symptoms he has had with his episodes of pancreatitis in the past.  He reports that he has been diagnosed with chronic pancreatitis.  Recent abdomen CT performed June 29 had demonstrated radiographic findings suspicious for chronic pancreatitis.  Lipase was mildly elevated at admission. Lipase initially normalized while n.p.o but again has increased after advancing to clear liquid diet.  He has had intermittent abdominal pain and epigastric tenderness.  Nausea and vomiting initially resolved, but nausea recurred early a.m. 7/8.  He was started on scheduled twice daily dose of Protonix on 7/7.  He has significantly improved clinically without any abdominal pain, nausea, or vomiting from 7/8 to 7/9 even while advancing to clear liquid diet, but lipase level has increased over the last 24 hours.  There is increasing suspicion that his symptoms of abdominal pain, nausea, and vomiting and his elevated serum lipase are due to gastroduodenitis rather than acute on chronic pancreatitis.  Additionally, he uses cannabis and could have cannabinoid hyperemesis syndrome.  Discussed cannabinoid hyperemesis syndrome with patient during hospitalization and advised abstinence from use of cannabis.  -Advance to moderate carbohydrate diabetic diet today as long as abdominal pain does not worsen and as long as he does not have recurrent nausea and vomiting  -If abdominal symptoms worsen while trying to advance diet today would reconsider diagnostic EGD prior to hospital discharge  -Continue twice daily dosing of Protonix which is new for him and recommend at least a 1 to 2-month treatment course of Protonix after hospital discharge  -Continue symptomatic treatment of pain with IV Dilaudid or oral oxycodone as needed  -Continue symptomatic treatment of nausea with antiemetics as needed  -Ordered recheck of lipase    Hypokalemia  Hypophosphatemia  Presented with potassium 3.3 and had phosphorus 2.4 most probably due to  recent vomiting and poor oral intake.  Hypokalemia and hypophosphatemia could contribute to generalized malaise.  Potassium and phosphorus normalized after supplementation.  -Continue potassium and/or phosphorus supplementation per standard protocol as indicated    Hypertension  Chronically takes lisinopril to treat hypertension.  Had taken lisinopril July 5 and had presented to ER later that evening with severe hypotension.  After aggressive fluid resuscitation treatment in the ER he became persistently hypertensive which improved after restarting lisinopril.  He has not had recurrent hypotension after restarting lisinopril during hospitalization.  -Continue chronic dose of lisinopril    Type 2 diabetes treated with insulin  Chronically uses insulin to treat diabetes with most recent hemoglobin A1c 6.6 on June 29.  He reports using NovoLin N insulin 25 units in the morning and 25 units at supper and using Novolin R insulin by sliding scale (admission medication reconciliation updated to reflect his use of Novolin R insulin). Blood sugars adequately controlled during hospitalization on low-dose glargine insulin in a.m. and correction scale NovoLog but has not yet fully advanced diet.  -Monitor blood sugars 4 times daily as diet advances  -Switch insulin treatment today to his usual chronic regimen of NPH insulin 25 units before meals twice daily and regular insulin with meals 3 times daily by sliding scale when his blood sugar is above 200    Anxiety and depression  Chronically taking duloxetine, buspirone, and gabapentin with previous diagnoses of anxiety and depression which had been stable although have been difficult to assess reliably because of acute illness.  Worsening anxiety symptoms have coincided with worsening abdominal symptoms and have been improved after doses of lorazepam as needed.  He has not demonstrated other overt clinical signs of serotonin syndrome.  -Continue chronic doses of duloxetine and  gabapentin  -Continue higher buspirone dose of 10 mg 3 times daily (compared with previous dose of 5 mg 3 times a day)  -Continue oral lorazepam 0.5 milligram every 4 hours as needed for anxiety       Diet: Clear Liquid Diet    DVT Prophylaxis: Low Risk/Ambulatory with no VTE prophylaxis indicated  Watson Catheter: Not present  Lines: None     Cardiac Monitoring: None  Code Status: Full Code      Clinically Significant Risk Factors                                Disposition Plan   Anticipate discharge home when medically stable, probably tomorrow       Tanmay Morgan MD  Hospitalist Service  [facility]  Securely message with [employer] (more info)  Text page via [facility] Paging/Directory   ______________________________________________________________________    Interval History   He overall is feeling better today.  He has not had any abdominal pain in the last 24 hours and has not needed any pain medication accordingly.  He also has not had any nausea in the last 24 hours.  He is tolerating clear liquid diet without any worsening abdominal pain or nausea.  He has not had burning pain in his chest or throat.  He had what he thought were some gas pains in the upper abdomen early this morning for which he took a dose of Tums after which those symptoms subsided.  He is noticing that his symptoms of anxiety worsen whenever his abdominal symptoms worsen.  He thinks the worsening abdominal symptoms trigger worsening anxiety.  He did notice that lorazepam helps his anxiety.  He remains afebrile and hemodynamically stable with intermittent bradycardia and persistent hypertension.  Oxygenation has been normal.  Urine output has been good.    Additional history is obtained from the patient.  He reports that he normally treats his diabetes using Novolin N insulin 25 units in the morning and at supper and using Novolin R insulin by sliding scale with meals 3 times a day.  He only uses the regular  insulin if his blood sugar is above 200.  There are days where he does not have to use any of the regular insulin.    Physical Exam   Vital Signs: Temp: 98.4  F (36.9  C) Temp src: Oral BP: (!) 148/94 Pulse: 75   Resp: 18 SpO2: 98 % O2 Device: None (Room air)     Patient Vitals for the past 24 hrs:   BP Temp Temp src Pulse Resp SpO2 Weight   07/09/23 0746 (!) 148/94 98.4  F (36.9  C) Oral 75 18 98 % --   07/09/23 0400 -- -- -- -- -- -- 69.9 kg (154 lb 1.6 oz)   07/08/23 2251 (!) 140/86 98.8  F (37.1  C) Oral 60 16 98 % --   07/08/23 1629 (!) 144/98 99.2  F (37.3  C) Oral 64 17 98 % --     Weight: 154 lbs 1.6 oz    General Appearance: Anxious appearing man without signs of acute distress sitting up in bed  GI: Normal bowel sounds, nondistended abdomen, soft, no abdominal tenderness      Medical Decision Making     51 MINUTES SPENT BY ME on the date of service doing chart review, history, exam, documentation & further activities per the note.      Data     I have personally reviewed the following data over the past 24 hrs:    N/A  \   N/A   / N/A     138 103 N/A /  189 (H)   4.1; 4.1 26 N/A \       ALT: N/A AST: N/A AP: N/A TBILI: N/A   ALB: N/A TOT PROTEIN: N/A LIPASE: 140 (H)        Blood sugars ranged 140 234 over the last day  Phosphorus 2.8  Lipase yesterday was 106 for comparison    Recent Labs   Lab 07/09/23  0744 07/09/23  0603 07/09/23  0351 07/08/23  2034 07/08/23  0733 07/08/23  0536 07/07/23  0752 07/07/23  0519 07/06/23  1139 07/06/23  0832 07/05/23  2247 07/05/23  2126   WBC  --   --   --   --   --   --   --   --   --  11.8*  --  11.2*   HGB  --   --   --   --   --   --   --   --   --  14.6  --  14.9   MCV  --   --   --   --   --   --   --   --   --  93  --  92   PLT  --   --   --   --   --   --   --   --   --  231  --  369   NA  --  138  --   --   --  141  --  138  --  141  --  141   POTASSIUM  --  4.1  4.1  --   --   --  4.3  --  4.1  --  4.0  --  3.3*   CHLORIDE  --  103  --   --   --  101  --  104   --  107  --  101   CO2  --  26  --   --   --  25  --  23  --  20*  --  28   BUN  --   --   --   --   --  12.4  --  13.4  --  14.8  --  24.6*   CR  --   --   --   --   --  0.75  --  0.65*  --  0.68  --  1.38*   ANIONGAP  --  9  --   --   --  15  --  11  --  14  --  12   IRMA  --   --   --   --   --  9.3  --  8.6  --  8.5*  --  9.5   *  --  159* 176*   < > 127*   < > 102*   < > 161*   < > 125*   ALBUMIN  --   --   --   --   --   --   --  3.5  --   --   --  4.2   PROTTOTAL  --   --   --   --   --   --   --  6.1*  --   --   --  6.9   BILITOTAL  --   --   --   --   --   --   --  0.7  --   --   --  0.7   ALKPHOS  --   --   --   --   --   --   --  55  --   --   --  61   ALT  --   --   --   --   --   --   --  53  --   --   --  26   AST  --   --   --   --   --   --   --  39  --   --   --  23   LIPASE  --  140*  --   --   --  106*  --   --   --  43  --  108*    < > = values in this interval not displayed.

## 2023-07-10 VITALS
WEIGHT: 152 LBS | SYSTOLIC BLOOD PRESSURE: 152 MMHG | DIASTOLIC BLOOD PRESSURE: 106 MMHG | OXYGEN SATURATION: 98 % | HEART RATE: 72 BPM | HEIGHT: 67 IN | TEMPERATURE: 98 F | RESPIRATION RATE: 16 BRPM | BODY MASS INDEX: 23.86 KG/M2

## 2023-07-10 LAB
GLUCOSE BLDC GLUCOMTR-MCNC: 110 MG/DL (ref 70–99)
GLUCOSE BLDC GLUCOMTR-MCNC: 215 MG/DL (ref 70–99)
GLUCOSE BLDC GLUCOMTR-MCNC: 68 MG/DL (ref 70–99)
GLUCOSE BLDC GLUCOMTR-MCNC: 69 MG/DL (ref 70–99)
HOLD SPECIMEN: NORMAL
LIPASE SERPL-CCNC: 158 U/L (ref 13–60)
PHOSPHATE SERPL-MCNC: 4.1 MG/DL (ref 2.5–4.5)
POTASSIUM SERPL-SCNC: 4.1 MMOL/L (ref 3.4–5.3)

## 2023-07-10 PROCEDURE — 36415 COLL VENOUS BLD VENIPUNCTURE: CPT | Performed by: PEDIATRICS

## 2023-07-10 PROCEDURE — 250N000013 HC RX MED GY IP 250 OP 250 PS 637: Performed by: INTERNAL MEDICINE

## 2023-07-10 PROCEDURE — 84100 ASSAY OF PHOSPHORUS: CPT | Performed by: PEDIATRICS

## 2023-07-10 PROCEDURE — 250N000013 HC RX MED GY IP 250 OP 250 PS 637: Performed by: PEDIATRICS

## 2023-07-10 PROCEDURE — 83690 ASSAY OF LIPASE: CPT | Performed by: PEDIATRICS

## 2023-07-10 PROCEDURE — 250N000012 HC RX MED GY IP 250 OP 636 PS 637: Performed by: PEDIATRICS

## 2023-07-10 PROCEDURE — 99239 HOSP IP/OBS DSCHRG MGMT >30: CPT | Performed by: NURSE PRACTITIONER

## 2023-07-10 PROCEDURE — 84132 ASSAY OF SERUM POTASSIUM: CPT | Performed by: PEDIATRICS

## 2023-07-10 RX ADMIN — DULOXETINE HYDROCHLORIDE 20 MG: 20 CAPSULE, DELAYED RELEASE ORAL at 08:24

## 2023-07-10 RX ADMIN — BUSPIRONE HYDROCHLORIDE 10 MG: 5 TABLET ORAL at 08:24

## 2023-07-10 RX ADMIN — PANTOPRAZOLE SODIUM 40 MG: 40 TABLET, DELAYED RELEASE ORAL at 06:18

## 2023-07-10 RX ADMIN — GABAPENTIN 300 MG: 300 CAPSULE ORAL at 08:24

## 2023-07-10 RX ADMIN — ATORVASTATIN CALCIUM 20 MG: 10 TABLET, FILM COATED ORAL at 08:24

## 2023-07-10 RX ADMIN — LISINOPRIL 10 MG: 10 TABLET ORAL at 08:24

## 2023-07-10 RX ADMIN — INSULIN HUMAN 25 UNITS: 100 INJECTION, SUSPENSION SUBCUTANEOUS at 08:26

## 2023-07-10 RX ADMIN — HUMAN INSULIN 5 UNITS: 100 INJECTION, SOLUTION SUBCUTANEOUS at 08:59

## 2023-07-10 ASSESSMENT — ACTIVITIES OF DAILY LIVING (ADL)
ADLS_ACUITY_SCORE: 18

## 2023-07-10 NOTE — PLAN OF CARE
S-(situation): Patient discharged to home via ambulatory with spouse    B-(background): patient admitted with abdominal pain    A-(assessment): Alert and oriented. Denies pain. Tolerating regular diet.     R-(recommendations): Discharge instructions reviewed with patient and spouse . Listed belongings gathered and returned to patient. na Patient currently has an appointment with his PCP July 25th; he states he will use that as his follow up appointment. Did not want staff to attempt to schedule another one.         Discharge Nursing Criteria:     Care Plan and Patient education resolved: Yes    New Medications- pt has been educated about purpose and side effects: Yes    Vaccines  Influenza status verified at discharge:  Not Applicable      MISC  Prescriptions if needed, hard copies sent with patient  Yes oxycodone and lorazepam scripts sent with patient  Home medications returned to patient: NA  Medication Bin checked and emptied on discharge Yes  Patient reports post-discharge pain management plan is effective: Yes

## 2023-07-10 NOTE — PLAN OF CARE
"Goal Outcome Evaluation:      Plan of Care Reviewed With: patient    Overall Patient Progress: improvingOverall Patient Progress: improving    Outcome Evaluation: A & O x4, able to make needs known.  Takes pills whole.  On RA w SpO2 >90%.  MAGDIEL domi IV patent.  Continent BB, uses urinal at bedside, Ind in room.  No c/o pain throughout shift.  Pt hypoglycemic overnight with glucose of 68.  Given two containers of orange juice, recheck in 15 mins only 69.  Pt then given chocolate ice cream and an additional two orange juices, 15 min recheck came up to 110.  Pt likely to discharge home 7/10.    /88 (BP Location: Right arm)   Pulse 61   Temp 98.3  F (36.8  C) (Oral)   Resp 20   Ht 1.702 m (5' 7\")   Wt 68.9 kg (152 lb)   SpO2 97%   BMI 23.81 kg/m      "

## 2023-07-10 NOTE — DISCHARGE SUMMARY
[facility]  Hospitalist Discharge Summary      Date of Admission:  7/5/2023  Date of Discharge:  7/10/2023  Discharging Provider: Margarita Foreman CNP  Discharge Service: Hospitalist Service    Discharge Diagnoses   Probable hypovolemic shock with elevated lactic acid and acute renal failure and syncope  Chronic pancreatitis with intractable nausea and vomiting and elevated lipase  Upper abdominal pain, suspect gastroduodenitis  Cannabis use  Hypokalemia  Hypophosphatemia  Hypertension  Diabetes type 2 treated with insulin  Anxiety and depression    Clinically Significant Risk Factors     # DMII: A1C = 6.6 % (Ref range: <5.7 %) within past 6 months       Follow-ups Needed After Discharge   Follow-up Appointments     Follow-up and recommended labs and tests       Follow up with primary care provider within 7 days to evaluate medication   change and for hospital follow- up.  The following labs/tests are   recommended: consider diagnostic EGD if symptoms continue.     Consider referral to diabetic educator as patient is motivated and intrested in improved diabetes management    Unresulted Labs Ordered in the Past 30 Days of this Admission       Date and Time Order Name Status Description    7/5/2023 11:22 PM Blood Culture Arm, Left Preliminary     7/5/2023 11:22 PM Blood Culture Arm, Right Preliminary         These results will be followed up by PCP    Discharge Disposition   Discharged to home  Condition at discharge: Stable    Hospital Course   37-year-old man with type 2 diabetes treated with insulin, hypertension, history of chronic pancreatitis, and remote history of cholecystectomy who presented to ER after syncopal event and was found to be severely hypotensive.  Due to concerns for refractory hypotension and acute on chronic pancreatitis, he was admitted.    Probable hypovolemic shock with elevated lactic acid, acute renal failure, and syncope  Presented with severe hypotension that  persisted for over 5 hours in the ER and did not improve until he had received 5 L IV fluid resuscitation.  Severe hypotension was associated with acute renal failure and elevated lactic acid level as well as syncope raising concern for shock.  Severe hypotension was probably multifactorial including hypovolemia after repeated vomiting but also the effect of chronic lisinopril treatment (which he has been previously prescribed to treat hypertension) and adverse effect of risperidone which he took on 7/5 (but which had not been prescribed for him).  After extensive IV fluid resuscitation, hypotension resolved and he did not require initiation of vasopressor therapy.  Other signs of shock have also resolved including recovery of lactic acid and renal function to normal and he has not had any recurrent episodes of syncope.  Although there was concern for possible infection initially, infection has not been identified nor is it suspected, so sepsis and septic shock have been ruled out.  Clinical course has not been worrisome for cardiogenic shock.    Upper abdominal pain, suspect gastroduodenitis  Intractable nausea and vomiting  Elevated lipase  Chronic pancreatitis  Cannabis use  Presented with recent history of worsening nausea and vomiting and had abdominal pain in the ER.  Patient reporting current symptoms are similar to symptoms he has had with his episodes of pancreatitis in the past.  He reports that he has been diagnosed with chronic pancreatitis.  Recent abdomen CT performed June 29 had demonstrated radiographic findings suspicious for chronic pancreatitis.  Lipase was mildly elevated at admission. Lipase initially normalized while n.p.o but again has increased after advancing to clear liquid diet.  He has had intermittent abdominal pain and epigastric tenderness.  Nausea and vomiting initially resolved, but nausea recurred early a.m. 7/8.  He was started on scheduled twice daily dose of Protonix on 7/7.  He  has significantly improved clinically without any abdominal pain, nausea, or vomiting from 7/8 to 7/9 even while advancing to clear liquid diet, but lipase level has increased over the last 24 hours.  There is increasing suspicion that his symptoms of abdominal pain, nausea, and vomiting and his elevated serum lipase are due to gastroduodenitis rather than acute on chronic pancreatitis.  Additionally, he uses cannabis and could have cannabinoid hyperemesis syndrome.  Discussed cannabinoid hyperemesis syndrome with patient during hospitalization and advised abstinence from use of cannabis.  Tolerated regular diet over last 24 hours w/o nausea, vomiting and abdominal.   Pain.  No PRN pain medication used in the last 24 hours  PLAN  -Consider EGD should symptoms worsen  -Continue twice daily dosing of Protonix which is new for him and recommend at least a 1 to 2-month treatment course of Protonix after hospital discharge  -Continue symptomatic treatment of pain oral oxycodone as needed    Hypokalemia  Hypophosphatemia  Presented with potassium 3.3 and had phosphorus 2.4 most probably due to recent vomiting and poor oral intake.  Hypokalemia and hypophosphatemia could contribute to generalized malaise.  Potassium and phosphorus normalized after supplementation.    Hypertension  Chronically takes lisinopril to treat hypertension.  Had taken lisinopril July 5 and had presented to ER later that evening with severe hypotension.  After aggressive fluid resuscitation treatment in the ER he became persistently hypertensive which improved after restarting lisinopril.  He has not had recurrent hypotension after restarting lisinopril during hospitalization.  PLAN  -Continue chronic dose of lisinopril    Type 2 diabetes treated with insulin  Chronically uses insulin to treat diabetes with most recent hemoglobin A1c 6.6 on June 29.  He reports using NovoLin N insulin 25 units in the morning and 25 units at supper and using Novolin  R insulin by sliding scale (admission medication reconciliation updated to reflect his use of Novolin R insulin). Overnight noted low blood sugar of 68 which improved with juice and ice cream.  This a.m. blood sugar 215.  Suspect low blood sugar was in part due to to restarting NPH and regular insulin regimen yesterday afternoon while also having Lantus yesterday morning.  Patient reports his appetite is back to normal and at home typically his blood sugars are a little bit higher in the morning rarely has low blood sugars.  PLAN  -Monitor blood sugars 4 times daily -Switch insulin treatment today to his usual chronic regimen of NPH insulin 25 units before meals twice daily and regular insulin with meals 3 times daily by sliding scale when his blood sugar is above 200  -Consider follow up with diabetic educator as patient is motivated to have better diabetes control    Anxiety and depression  Chronically taking duloxetine, buspirone, and gabapentin with previous diagnoses of anxiety and depression which had been stable although have been difficult to assess reliably because of acute illness.  Worsening anxiety symptoms have coincided with worsening abdominal symptoms and have been improved after doses of lorazepam as needed.  He has not demonstrated other overt clinical signs of serotonin syndrome.  No PRN ativan needed in the last 24 hours.    PLAN  -Continue chronic doses of duloxetine and gabapentin  -Continue higher buspirone dose of 10 mg 3 times daily (compared with previous dose of 5 mg 3 times a day)  -Continue oral lorazepam 0.5 milligram every 4 hours as needed for anxiety    Consultations This Hospital Stay   PHARMACY TO DOSE VANCO    Code Status   Full Code    Time Spent on this Encounter   I, Margarita Foreman CNP, personally saw the patient today and spent greater than 30 minutes discharging this patient.       Margarita Foreman CNP  [address] MEDICAL SURGICAL  [address] [place]  [address]  Phone: [phone]  ______________________________________________________________________    Physical Exam   Vital Signs: Temp: 98.3  F (36.8  C) Temp src: Oral BP: 130/88 Pulse: 61   Resp: 20 SpO2: 97 % O2 Device: None (Room air)    Weight: 152 lbs 0 oz  General Appearance: Alert and oriented, does not appear anxious.  Engaged in conversation.  Respiratory: Respiratory effort easy, no cough.  Lung sounds clear.  Cardiovascular: Normal S1-S2, regular no lower extremity edema.  GI: Bowel sounds noted, abdomen nondistended, soft with no abdominal tenderness on palpation.  Skin: Skin grossly intact.          Primary Care Physician   Physician No Ref-Primary    Discharge Orders      Reason for your hospital stay    Hospitalized due to dangerously low blood pressure (shock) and improved. Suspected to have irritation of stomach and small intestine (gastroduodenitis) causing abdominal discomfort and nausea so advised to start antacid therapy with Protonix. Advised to avoid cannabis use because it can aggravate nausea and vomiting.     Follow-up and recommended labs and tests     Follow up with primary care provider within 7 days to evaluate medication change and for hospital follow- up.  The following labs/tests are recommended: consider diagnostic EGD if symptoms continue.     Activity    Your activity upon discharge: activity as tolerated and no driving while on narcotic analgesics (oxycodone) or sedatives (lorazepam)     Monitor and record    blood glucose 3-4 times a day, before meals and at bedtime     Diet    Follow this diet upon discharge: Orders Placed This Encounter      Moderate Consistent Carb (60 g CHO per Meal) Diet       Significant Results and Procedures   Most Recent 3 CBC's:  Recent Labs   Lab Test 07/06/23  0832 07/05/23  2126 06/29/23  1549   WBC 11.8* 11.2* 12.2*   HGB 14.6 14.9 16.3   MCV 93 92 92    369 305     Most Recent 3 BMP's:  Recent Labs   Lab Test 07/10/23  0751  07/10/23  0545 07/10/23  0318 07/10/23  0244 07/09/23  0744 07/09/23  0603 07/08/23  0733 07/08/23  0536 07/07/23  0752 07/07/23  0519 07/06/23  1139 07/06/23  0832   NA  --   --   --   --   --  138  --  141  --  138  --  141   POTASSIUM  --  4.1  --   --   --  4.1  4.1  --  4.3  --  4.1  --  4.0   CHLORIDE  --   --   --   --   --  103  --  101  --  104  --  107   CO2  --   --   --   --   --  26  --  25  --  23  --  20*   BUN  --   --   --   --   --   --   --  12.4  --  13.4  --  14.8   CR  --   --   --   --   --   --   --  0.75  --  0.65*  --  0.68   ANIONGAP  --   --   --   --   --  9  --  15  --  11  --  14   IRMA  --   --   --   --   --   --   --  9.3  --  8.6  --  8.5*   *  --  110* 69*   < >  --    < > 127*   < > 102*   < > 161*    < > = values in this interval not displayed.     Most Recent 2 LFT's:  Recent Labs   Lab Test 07/07/23  0519 07/05/23 2126   AST 39 23   ALT 53 26   ALKPHOS 55 61   BILITOTAL 0.7 0.7     Most Recent Hemoglobin A1c:  Recent Labs   Lab Test 06/21/23  1229   A1C 6.6*     Most Recent 6 glucoses:  Recent Labs   Lab Test 07/10/23  0751 07/10/23  0318 07/10/23  0244 07/10/23  0202 07/09/23  2211 07/09/23  2119   * 110* 69* 68* 110* 72     Lipase   Date Value Ref Range Status   07/10/2023 158 (H) 13 - 60 U/L Final     ,   Results for orders placed or performed during the hospital encounter of 07/05/23   XR Chest Port 1 View    Narrative    EXAM: XR CHEST PORT 1 VIEW  LOCATION: [facility]  DATE: 7/5/2023    INDICATION: Sepsis.  COMPARISON: None.    FINDINGS: Normal heart size and pulmonary vascularity. No acute appearing infiltrates or consolidation. Somewhat circumscribed 1.3 cm nodular density projects over the right upper lung and is suspected to be due to normal variant irregularity of the   right first anterior costochondral junction. Recommend follow-up chest x-ray in 2-3 months for reevaluation. Chest is otherwise negative. No definite  acute findings.      Impression    IMPRESSION:   No definite acute findings. 1.3 cm somewhat circumscribed nodular density over the right upper chest is favored to be related to the right first anterior costochondral junction. Recommend follow-up chest x-ray in 2-3 months to monitor for stability of   this appearance.   XR Abdomen 2 Views    Narrative    EXAM: XR ABDOMEN 2 VIEWS  LOCATION: [facility]  DATE: 7/6/2023    INDICATION: Vomiting.  COMPARISON: CT abdomen and pelvis with IV contrast 06/29/2023.      Impression    IMPRESSION: Cholecystectomy. Scattered gas and stool material within normal caliber bowel. No mechanical obstruction or free gas. No opaque urinary tract calculi. Anatomic alignment of the bones and joints. Visualized lower lungs are clear.       Discharge Medications   Current Discharge Medication List        START taking these medications    Details   calcium carbonate (TUMS) 500 MG chewable tablet Take 2 tablets (1,000 mg) by mouth 3 times daily as needed for heartburn    Associated Diagnoses: Gastroduodenitis without bleeding      LORazepam (ATIVAN) 0.5 MG tablet Take 1 tablet (0.5 mg) by mouth every 4 hours as needed for anxiety or agitation  Qty: 6 tablet, Refills: 0    Associated Diagnoses: Anxiety      pantoprazole (PROTONIX) 40 MG EC tablet Take 1 tablet (40 mg) by mouth 2 times daily (before meals)  Qty: 60 tablet, Refills: 1    Associated Diagnoses: Gastroduodenitis without bleeding           CONTINUE these medications which have CHANGED    Details   busPIRone (BUSPAR) 5 MG tablet Take 2 tablets (10 mg) by mouth 3 times daily  Qty: 90 tablet, Refills: 3    Associated Diagnoses: Anxiety      oxyCODONE (ROXICODONE) 5 MG tablet Take 1 tablet (5 mg) by mouth every 6 hours as needed for severe pain  Qty: 6 tablet, Refills: 0    Associated Diagnoses: Chronic pancreatitis, unspecified pancreatitis type (H)           CONTINUE these medications which have NOT  CHANGED    Details   acetaminophen (TYLENOL) 500 MG tablet Take 1,000 mg by mouth every 8 hours as needed for mild pain      atorvastatin (LIPITOR) 20 MG tablet Take 1 tablet (20 mg) by mouth daily  Qty: 90 tablet, Refills: 3    Associated Diagnoses: Type 1 diabetes mellitus with diabetic neuropathy (H)      DULoxetine (CYMBALTA) 20 MG capsule Take 1 capsule (20 mg) by mouth daily  Qty: 90 capsule, Refills: 3    Associated Diagnoses: Recurrent major depressive disorder, in partial remission (H); Anxiety      gabapentin (NEURONTIN) 300 MG capsule Take 1 capsule (300 mg) by mouth 3 times daily  Qty: 90 capsule, Refills: 3    Associated Diagnoses: Type 1 diabetes mellitus with diabetic neuropathy (H)      insulin NPH (NOVOLIN N VIAL) 100 UNIT/ML vial Inject 25 Units Subcutaneous 2 times daily  Qty: 30 mL, Refills: 3    Associated Diagnoses: Type 1 diabetes mellitus with diabetic neuropathy (H)      insulin regular 100 UNIT/ML vial Inject Subcutaneous 3 times daily (with meals) Sliding scale: use 5 units for -250, 10 units for -300, 15 units for -400, contact MD for BS over 400      lisinopril (ZESTRIL) 10 MG tablet Take 1 tablet (10 mg) by mouth daily  Qty: 90 tablet, Refills: 3    Associated Diagnoses: Type 1 diabetes mellitus with diabetic neuropathy (H); Hypertension, unspecified type      ondansetron (ZOFRAN ODT) 8 MG ODT tab Dissolve 1 tablet in mouth every 8 to 12 hours as needed for nausea or vomiting  Qty: 20 tablet, Refills: 0    Associated Diagnoses: Chronic pancreatitis, unspecified pancreatitis type (H)      !! blood glucose (ONETOUCH VERIO IQ) test strip 1 strip by Other route      !! blood glucose (ONETOUCH VERIO IQ) test strip Use every morning as directed to measure blood sugar      metoclopramide (REGLAN) 5 MG tablet Take 1 tablet (5 mg) by mouth 3 times daily as needed (with meals as needed)  Qty: 90 tablet, Refills: 0    Associated Diagnoses: Chronic pancreatitis, unspecified  pancreatitis type (H)      prochlorperazine (COMPAZINE) 10 MG tablet Take 1 tablet (10 mg) by mouth every 6 hours as needed for nausea or vomiting  Qty: 20 tablet, Refills: 0       !! - Potential duplicate medications found. Please discuss with provider.        STOP taking these medications       omeprazole (PRILOSEC) 20 MG DR capsule Comments:   Reason for Stopping:         omeprazole (PRILOSEC) 40 MG DR capsule Comments:   Reason for Stopping:             Allergies   Allergies   Allergen Reactions    Morphine      Other reaction(s): Altered Mental Status    Sulfa Antibiotics Hives and Rash

## 2023-07-11 LAB
BACTERIA BLD CULT: NO GROWTH
BACTERIA BLD CULT: NO GROWTH

## 2023-07-12 ENCOUNTER — PATIENT OUTREACH (OUTPATIENT)
Dept: CARE COORDINATION | Facility: CLINIC | Age: 38
End: 2023-07-12
Payer: MEDICARE

## 2023-07-12 NOTE — PROGRESS NOTES
[facility] Contact  [facility]/Voicemail     Clinical Data: Transitional Care Management Outreach     Outreach attempted x 2.  Left message on patient's voicemail, providing [facility]'s 24/7 scheduling and nurse triage phone number [phone] ([phone]) for questions/concerns and/or to schedule an appt with an [facility] provider, if they do not have a PCP.      Plan:  [facility] will do no further outreaches at this time.       Erika Mckenzie  Community Health Worker  [facility], [facility]  [phone]      *Connected Care Resource Team does NOT follow patient ongoing. Referrals are identified based on internal discharge reports and the outreach is to ensure patient has an understanding of their discharge instructions.  
7

## 2023-07-25 ENCOUNTER — VIRTUAL VISIT (OUTPATIENT)
Dept: FAMILY MEDICINE | Facility: CLINIC | Age: 38
End: 2023-07-25
Payer: MEDICARE

## 2023-07-25 ENCOUNTER — CARE COORDINATION (OUTPATIENT)
Dept: GASTROENTEROLOGY | Facility: CLINIC | Age: 38
End: 2023-07-25

## 2023-07-25 DIAGNOSIS — Z09 HOSPITAL DISCHARGE FOLLOW-UP: Primary | ICD-10-CM

## 2023-07-25 DIAGNOSIS — F32.4 MAJOR DEPRESSIVE DISORDER, SINGLE EPISODE, IN PARTIAL REMISSION (H): ICD-10-CM

## 2023-07-25 DIAGNOSIS — E10.40 TYPE 1 DIABETES MELLITUS WITH DIABETIC NEUROPATHY (H): ICD-10-CM

## 2023-07-25 DIAGNOSIS — F12.90 MARIJUANA USE: ICD-10-CM

## 2023-07-25 DIAGNOSIS — F41.9 ANXIETY: ICD-10-CM

## 2023-07-25 DIAGNOSIS — N17.9 ACUTE RENAL FAILURE, UNSPECIFIED ACUTE RENAL FAILURE TYPE (H): ICD-10-CM

## 2023-07-25 DIAGNOSIS — M54.50 ACUTE RIGHT-SIDED LOW BACK PAIN WITHOUT SCIATICA: ICD-10-CM

## 2023-07-25 DIAGNOSIS — R57.1 HYPOVOLEMIC SHOCK (H): ICD-10-CM

## 2023-07-25 DIAGNOSIS — R11.2 NAUSEA AND VOMITING, UNSPECIFIED VOMITING TYPE: ICD-10-CM

## 2023-07-25 DIAGNOSIS — Z90.49 HX OF CHOLECYSTECTOMY: ICD-10-CM

## 2023-07-25 DIAGNOSIS — K86.3 PSEUDOCYST, PANCREAS: ICD-10-CM

## 2023-07-25 DIAGNOSIS — K86.1 CHRONIC PANCREATITIS, UNSPECIFIED PANCREATITIS TYPE (H): ICD-10-CM

## 2023-07-25 DIAGNOSIS — I10 HYPERTENSION, UNSPECIFIED TYPE: ICD-10-CM

## 2023-07-25 PROCEDURE — 99495 TRANSJ CARE MGMT MOD F2F 14D: CPT | Mod: 95 | Performed by: STUDENT IN AN ORGANIZED HEALTH CARE EDUCATION/TRAINING PROGRAM

## 2023-07-25 RX ORDER — CYCLOBENZAPRINE HCL 5 MG
5 TABLET ORAL 3 TIMES DAILY PRN
Qty: 30 TABLET | Refills: 1 | Status: SHIPPED | OUTPATIENT
Start: 2023-07-25

## 2023-07-25 ASSESSMENT — PATIENT HEALTH QUESTIONNAIRE - PHQ9
SUM OF ALL RESPONSES TO PHQ QUESTIONS 1-9: 9
SUM OF ALL RESPONSES TO PHQ QUESTIONS 1-9: 9
10. IF YOU CHECKED OFF ANY PROBLEMS, HOW DIFFICULT HAVE THESE PROBLEMS MADE IT FOR YOU TO DO YOUR WORK, TAKE CARE OF THINGS AT HOME, OR GET ALONG WITH OTHER PEOPLE: SOMEWHAT DIFFICULT

## 2023-07-25 NOTE — PROGRESS NOTES
Elias is a 38 year old who is seen for hospital follow up.     Assessment & Plan     Hospital discharge follow-up    Hypovolemic shock (H)  Resolved upon discharge    Chronic pancreatitis, unspecified pancreatitis type (H)  Pseudocyst, pancreas  Hx of cholecystectomy  Patient with recurrent flares of his chronic pancreatitis which has been longstanding for him.  Do question if recent nausea and vomiting was related to his pancreatitis versus gastroenteritis and/or cannabis hyperemesis syndrome.  Recommend complete cessation of cannabis.  Does have antinausea medication on hand for as needed episodes in the future.  Given his complicated pancreas history we will have him follow-up with GI to establish locally.  Weights have maintained and has not needed Creon with toleration of oral intake well.  Would not recommend long-term continuous opiates but reasonable to have on hand for flare.  Potential side effects of interaction of oxycodone discussed including respiratory suppression, death and addiction.  Small amount provided today.    Nausea and vomiting, unspecified vomiting type  Resolved now patient does have medication at home as needed for future episodes.    Acute renal failure, unspecified acute renal failure type (H)  Resolved with hydration and improvement of his hypotension    Hypertension, unspecified type  Has been stable with chronic lisinopril and will continue now.  Plan for him to monitor home blood pressures and discuss further upon follow-up in 1 month.    Acute right-sided low back pain without sciatica  Back pain appears musculoskeletal in nature.  Did discuss regular exercise and stretching long-term.  Can do trial of muscle relaxer and see if this provides relief.  - cyclobenzaprine (FLEXERIL) 5 MG tablet  Dispense: 30 tablet; Refill: 1    Major depressive disorder, single episode, in partial remission (H)  Anxiety  Patient has generally been stable but will establish with psychology now.   "Continues on Cymbalta and BuSpar  - Adult Mental Health  Referral    Type 1 diabetes mellitus with diabetic neuropathy (H)  Patient with good control of his diabetes on NPH twice daily with sliding scale.  He is on atorvastatin and lisinopril.  Continue current regiment and repeat A1c in 2 months.  Monitor sugars at least 3 times daily.  Follow-up for eye evaluation.    Marijuana use  Advised complete abstinence given risk of cannabis hyperemesis syndrome contributing to his symptoms.  Patient understanding and not wanting further treatment options at this time.           MED REC REQUIRED  Post Medication Reconciliation Status:  Discharge medications reconciled and changed, see notes/orders  BMI:   Estimated body mass index is 23.81 kg/m  as calculated from the following:    Height as of 7/6/23: 1.702 m (5' 7\").    Weight as of 7/10/23: 68.9 kg (152 lb).       Nahun Clemente MD  [facility] [place] is a 38 year old, presenting for the following health issues:  Hospital F/U and Pain Management        7/25/2023     8:29 AM   Additional Questions   Roomed by Иван MCKAY   Accompanied by Self         7/25/2023     8:29 AM   Patient Reported Additional Medications   Patient reports taking the following new medications None     HPI       Hospital Follow-up Visit:    Hospital/Nursing Home/IP Rehab Facility: [facility]  Date of Admission: 7/5/2023  Date of Discharge: 7/10/2023  Reason(s) for Admission: Patient completed E-check in. Questionnaires were blown in and Patient checked in without being called.     Was your hospitalization related to COVID-19? No   Problems taking medications regularly:  None  Medication changes since discharge:   calcium carbonate (TUMS) 500 MG chewable tablet Take 2 tablets (1,000 mg) by mouth 3 times daily as needed for heartburn     Associated Diagnoses: Gastroduodenitis without bleeding       LORazepam (ATIVAN) 0.5 MG " tablet Take 1 tablet (0.5 mg) by mouth every 4 hours as needed for anxiety or agitation  Qty: 6 tablet, Refills: 0     Associated Diagnoses: Anxiety       pantoprazole (PROTONIX) 40 MG EC tablet Take 1 tablet (40 mg) by mouth 2 times daily (before meals)  Qty: 60 tablet, Refills: 1     Associated Diagnoses: Gastroduodenitis without bleeding          CONTINUE these medications which have CHANGED     Details   busPIRone (BUSPAR) 5 MG tablet Take 2 tablets (10 mg) by mouth 3 times daily  Qty: 90 tablet, Refills: 3     Associated Diagnoses: Anxiety       oxyCODONE (ROXICODONE) 5 MG tablet Take 1 tablet (5 mg) by mouth every 6 hours as needed for severe pain  Qty: 6 tablet, Refills: 0     Associated Diagnoses: Chronic pancreatitis, unspecified pancreatitis type (H)         Problems adhering to non-medication therapy:  None    Summary of hospitalization:  [facility] discharge summary reviewed  Diagnostic Tests/Treatments reviewed.  Follow up needed: GI, psychology  Other Healthcare Providers Involved in Patient s Care:         None  Update since discharge: improved.         Plan of care communicated with patient and family               Review of Systems   Constitutional, HEENT, cardiovascular, pulmonary, GI, , musculoskeletal, neuro, skin, endocrine and psych systems are negative, except as otherwise noted.      Objective           Physical Exam   Exam:  Constitutional: healthy, alert, and no distress  Head: Normocephalic. No masses, lesions, tenderness or abnormalities  Neck: Neck supple. No adenopathy. Thyroid symmetric, normal size,, Carotids without bruits.  Cardiovascular: negative, PMI normal. No lifts, heaves, or thrills. RRR. No murmurs, clicks gallops or rub  Respiratory: negative,  Lungs clear  Gastrointestinal: Abdomen soft, non-tender. BS normal. No masses, organomegaly  extremities normal- no gross deformities noted, gait normal, and normal muscle tone  Skin: no suspicious lesions or  brianna  Neurologic: negative  Psychiatric: mentation appears normal and affect normal/bright

## 2023-07-25 NOTE — PROGRESS NOTES
RN review of referral for chronic pancreatitis    Recent admission at [facility]. Previous admission at [facility] for the same, transferred from [facility]. Previously lived in [place].    Did not have complete work up with GI in [place], MRCP done, sMRCP has not been completed.      [facility] notes from admission May 2023:  37-year-old gentleman transferred to our hospital from [facility] for evaluation of acute on chronic pancreatitis. Patient with past history significant for insulin-dependent type 2 diabetes mellitus and chronic pancreatitis for the past 9 years. Endorses history of EtOH abuse in the past but has been sober for 9 years. [place] is originally from [place]       Lipase   Date Value Ref Range Status   07/10/2023 158 (H) 13 - 60 U/L Final       CT A/p 6-29-23                                                                     IMPRESSION:   1.  Pancreatic parenchymal atrophy and calcifications consistent with chronic pancreatitis. Low-attenuation areas in the distal body and tail could be dilated pancreatic duct, cystic lesions, or fluid collections. Definitive characterization is difficult   without comparison imaging (not available time of dictation).     2.  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    Prior Care at [facility] in [place]    MRCP 8/23/22  1. Limited exam because of patient motion with artifacts particularly of the pancreas.   2. 1.2 cm pancreatic body/tail cyst without suspicious features, previously 1.5 cm. No pancreatic duct dilation.   Pancreatic cystic lesion 0.6-1.4 cm without high risk features. Lesion is stable. Recommend pancreas protocol CT study in 2 years. Consider GI referral for symptomatic patients. (#[id number])   3. Mild extrahepatic biliary duct dilation without intrahepatic biliary duct dilation, less pronounced than on the prior CT and may be within normal  limits postcholecystectomy.   4. No evidence of biliary duct stones on MRCP images.   5. The irregular ill-defined hypodensity in the left hepatic lobe on prior CT persists on this exam and may represent focal fatty infiltration. Recommend attention on next follow-up surveillance exam.

## 2023-08-01 ENCOUNTER — VIRTUAL VISIT (OUTPATIENT)
Dept: PSYCHOLOGY | Facility: CLINIC | Age: 38
End: 2023-08-01
Payer: MEDICARE

## 2023-08-01 DIAGNOSIS — F41.9 ANXIETY: ICD-10-CM

## 2023-08-01 DIAGNOSIS — F32.4 MAJOR DEPRESSIVE DISORDER, SINGLE EPISODE, IN PARTIAL REMISSION (H): ICD-10-CM

## 2023-08-01 DIAGNOSIS — F41.1 GAD (GENERALIZED ANXIETY DISORDER): Primary | ICD-10-CM

## 2023-08-01 PROCEDURE — 90791 PSYCH DIAGNOSTIC EVALUATION: CPT | Mod: VID

## 2023-08-01 ASSESSMENT — COLUMBIA-SUICIDE SEVERITY RATING SCALE - C-SSRS
TOTAL  NUMBER OF ABORTED OR SELF INTERRUPTED ATTEMPTS LIFETIME: NO
6. HAVE YOU EVER DONE ANYTHING, STARTED TO DO ANYTHING, OR PREPARED TO DO ANYTHING TO END YOUR LIFE?: NO
1. HAVE YOU WISHED YOU WERE DEAD OR WISHED YOU COULD GO TO SLEEP AND NOT WAKE UP?: NO
TOTAL  NUMBER OF INTERRUPTED ATTEMPTS LIFETIME: NO
2. HAVE YOU ACTUALLY HAD ANY THOUGHTS OF KILLING YOURSELF?: NO
ATTEMPT LIFETIME: NO

## 2023-08-01 NOTE — PROGRESS NOTES
[facility]   Mental Health & Addiction Services     Progress Note - Initial Visit    Patient  Name:  Elias Garcia Date: 23         Service Type: Individual     Visit Start Time: 2:31pm  Visit End Time: 3:17pm    Visit #: 1    Attendees: Client attended alone    Service Modality:  Video Visit:      Provider verified identity through the following two step process.  Patient provided:  Patient     Telemedicine Visit: The patient's condition can be safely assessed and treated via synchronous audio and visual telemedicine encounter.      Reason for Telemedicine Visit: Services only offered telehealth    Originating Site (Patient Location): Patient's home    Distant Site (Provider Location): Provider Remote Setting- Home Office    Consent:  The patient/guardian has verbally consented to: the potential risks and benefits of telemedicine (video visit) versus in person care; bill my insurance or make self-payment for services provided; and responsibility for payment of non-covered services.     Patient would like the video invitation sent by:  Send to e-mail at: [email or web address]    Mode of Communication:  Video Conference via [employer]    Distant Location (Provider):  Off-site    As the provider I attest to compliance with applicable laws and regulations related to telemedicine.       DATA:   Interactive Complexity: No  Yes, visit entailed Interactive Complexity evidenced by:building rapport, conducting assessments, and assessing safety.   Crisis: No     Presenting Concerns/  Current Stressors:   Client's current concerns pertain to pain management in regards to chronic pancreatitis and suspected gastroduodenitis. Patient reported frequent visit to the ER for painful symptoms such as chills, vomiting, and unbearable abdominal pain. Client reported difficulty sleeping with some nights only sleeping 4 hours and stated how it has impacted his mental health. Client reported restlessness when  "trying to fall asleep and being kept up with excessive thoughts. Client reported relationships concerned of changed behavior with elevated irritability, anger, and a \"mean demeanor\".   ASSESSMENT:  Mental Status Assessment:  Appearance:   Appropriate   Eye Contact:   Good   Psychomotor Behavior: Normal   Attitude:   Cooperative   Orientation:   All  Speech   Rate / Production: Normal/ Responsive   Volume:  Normal   Mood:    Normal  Affect:    Appropriate   Thought Content:  Clear   Thought Form:  Coherent  Logical   Insight:    Good       Safety Issues and Plan for Safety and Risk Management:   [place] Suicide Severity Rating Scale (Lifetime/Recent)      8/1/2023     4:26 PM   [place] Suicide Severity Rating (Lifetime/Recent)   Q1 Wish to be Dead (Lifetime) N   Q2 Non-Specific Active Suicidal Thoughts (Lifetime) N   Actual Attempt (Lifetime) N   Has subject engaged in non-suicidal self-injurious behavior? (Lifetime) N   Interrupted Attempts (Lifetime) N   Aborted or Self-Interrupted Attempt (Lifetime) N   Preparatory Acts or Behavior (Lifetime) N   Calculated C-SSRS Risk Score (Lifetime/Recent) No Risk Indicated     Patient denies current fears or concerns for personal safety.  Patient denies current or recent suicidal ideation or behaviors.  Patient denies current or recent homicidal ideation or behaviors.  Patient denies current or recent self injurious behavior or ideation.  Patient denies other safety concerns.  Recommended that patient call 911 or go to the local ED should there be a change in any of these risk factors.  Patient reports there are no firearms in the house.     Diagnostic Criteria:  Generalized Anxiety Disorder  A. Excessive anxiety and worry about a number of events or activities (such as work or school performance).   B. The person finds it difficult to control the worry.  C. Select 3 or more symptoms (required for diagnosis). Only one item is required in children.   - Restlessness or feeling " keyed up or on edge.    - Being easily fatigued.    - Difficulty concentrating or mind going blank.    - Irritability.    - Muscle tension.    - Sleep disturbance (difficulty falling or staying asleep, or restless unsatisfying sleep).   D. The focus of the anxiety and worry is not confined to features of an Axis I disorder.  E. The anxiety, worry, or physical symptoms cause clinically significant distress or impairment in social, occupational, or other important areas of functioning.   F. The disturbance is not due to the direct physiological effects of a substance (e.g., a drug of abuse, a medication) or a general medical condition (e.g., hyperthyroidism) and does not occur exclusively during a Mood Disorder, a Psychotic Disorder, or a Pervasive Developmental Disorder.      DSM5 Diagnoses: (Sustained by DSM5 Criteria Listed Above)  Diagnoses: 300.02 (F41.1) Generalized Anxiety Disorder  Psychosocial & Contextual Factors: Move from [place], chronic pain, [place] and living with significant other, sense of new found trust in healthcare systems.   Intervention:   Rapport building, assessment, and therapeutic introduction.   Collateral Reports Completed:  Not Applicable      PLAN: (Homework, other):  1. Provider will continue Diagnostic Assessment.      2. Provider recommended the following referrals: Not applicable at the moment.      3.  Suicide Risk and Safety Concerns were assessed for Elias Garcia.      ROSENDO Lawrence  August 3, 2023  This note has been reviewed and I agree with the plan of care. This note is co-signed by PEDRITO Lawrence, ROSENDO, Supervisor, on: 8/04/2023

## 2023-08-04 PROBLEM — F41.1 GAD (GENERALIZED ANXIETY DISORDER): Status: ACTIVE | Noted: 2023-08-04

## 2023-08-09 RX ORDER — OXYCODONE HYDROCHLORIDE 5 MG/1
5 TABLET ORAL EVERY 6 HOURS PRN
Qty: 6 TABLET | Refills: 0 | Status: SHIPPED | OUTPATIENT
Start: 2023-08-09 | End: 2023-08-12

## 2023-08-17 ENCOUNTER — E-VISIT (OUTPATIENT)
Dept: FAMILY MEDICINE | Facility: CLINIC | Age: 38
End: 2023-08-17
Payer: MEDICARE

## 2023-08-17 DIAGNOSIS — E10.40 TYPE 1 DIABETES MELLITUS WITH DIABETIC NEUROPATHY (H): ICD-10-CM

## 2023-08-17 PROCEDURE — 99207 PR NON-BILLABLE SERV PER CHARTING: CPT | Performed by: STUDENT IN AN ORGANIZED HEALTH CARE EDUCATION/TRAINING PROGRAM

## 2023-08-17 RX ORDER — HUMAN INSULIN 100 [IU]/ML
25 INJECTION, SUSPENSION SUBCUTANEOUS 2 TIMES DAILY
Qty: 30 ML | Refills: 3 | Status: SHIPPED | OUTPATIENT
Start: 2023-08-17 | End: 2024-03-08

## 2023-08-17 NOTE — PATIENT INSTRUCTIONS
Thank you for choosing us for your care. I have placed an order for a prescription so that you can start treatment. View your full visit summary for details by clicking on the link below. Your pharmacist will able to address any questions you may have about the medication.     If you're not feeling better within 5-7 days, please schedule an appointment.  You can schedule an appointment right here in [facility], or call [phone]  If the visit is for the same symptoms as your eVisit, we'll refund the cost of your eVisit if seen within seven days.

## 2023-09-01 ENCOUNTER — VIRTUAL VISIT (OUTPATIENT)
Dept: PSYCHOLOGY | Facility: CLINIC | Age: 38
End: 2023-09-01
Payer: MEDICARE

## 2023-09-01 DIAGNOSIS — F41.1 GAD (GENERALIZED ANXIETY DISORDER): Primary | ICD-10-CM

## 2023-09-01 PROCEDURE — 90791 PSYCH DIAGNOSTIC EVALUATION: CPT | Mod: 95

## 2023-09-01 ASSESSMENT — ANXIETY QUESTIONNAIRES
6. BECOMING EASILY ANNOYED OR IRRITABLE: MORE THAN HALF THE DAYS
2. NOT BEING ABLE TO STOP OR CONTROL WORRYING: NEARLY EVERY DAY
3. WORRYING TOO MUCH ABOUT DIFFERENT THINGS: NEARLY EVERY DAY
7. FEELING AFRAID AS IF SOMETHING AWFUL MIGHT HAPPEN: MORE THAN HALF THE DAYS
5. BEING SO RESTLESS THAT IT IS HARD TO SIT STILL: SEVERAL DAYS
IF YOU CHECKED OFF ANY PROBLEMS ON THIS QUESTIONNAIRE, HOW DIFFICULT HAVE THESE PROBLEMS MADE IT FOR YOU TO DO YOUR WORK, TAKE CARE OF THINGS AT HOME, OR GET ALONG WITH OTHER PEOPLE: SOMEWHAT DIFFICULT
GAD7 TOTAL SCORE: 17
4. TROUBLE RELAXING: NEARLY EVERY DAY
1. FEELING NERVOUS, ANXIOUS, OR ON EDGE: NEARLY EVERY DAY
GAD7 TOTAL SCORE: 17

## 2023-09-01 ASSESSMENT — PATIENT HEALTH QUESTIONNAIRE - PHQ9
SUM OF ALL RESPONSES TO PHQ QUESTIONS 1-9: 12
SUM OF ALL RESPONSES TO PHQ QUESTIONS 1-9: 12
10. IF YOU CHECKED OFF ANY PROBLEMS, HOW DIFFICULT HAVE THESE PROBLEMS MADE IT FOR YOU TO DO YOUR WORK, TAKE CARE OF THINGS AT HOME, OR GET ALONG WITH OTHER PEOPLE: VERY DIFFICULT

## 2023-09-01 NOTE — PROGRESS NOTES
"       [facility]      PATIENT'S NAME: Elias Garcia  PREFERRED NAME: Elias  PRONOUNS:     He/Him/His  MRN: [id number]  : 1985  ADDRESS: [address] [place] [id number]  [facility]:  [id number]  DATE OF SERVICE: 23  START TIME: 2:30pm  END TIME: 3:17pm  PREFERRED PHONE: [phone]  May we leave a program related message: Yes  SERVICE MODALITY:  Video Visit:      Provider verified identity through the following two step process.  Patient provided:  Patient     Telemedicine Visit: The patient's condition can be safely assessed and treated via synchronous audio and visual telemedicine encounter.      Reason for Telemedicine Visit: Patient has requested telehealth visit    Originating Site (Patient Location): Patient's home    Distant Site (Provider Location): [facility] [place]    Consent:  The patient/guardian has verbally consented to: the potential risks and benefits of telemedicine (video visit) versus in person care; bill my insurance or make self-payment for services provided; and responsibility for payment of non-covered services.     Patient would like the video invitation sent by:  Send to e-mail at: [email or web address]    Mode of Communication:  Video Conference via Amwell    Distant Location (Provider):  On-site    As the provider I attest to compliance with applicable laws and regulations related to telemedicine.    UNIVERSAL ADULT Mental Health DIAGNOSTIC ASSESSMENT    Identifying Information:  Patient is a 38 year old,    individual.  Patient was referred for an assessment by self and primary care provider .  Patient attended the session alone.    Chief Complaint:   The reason for seeking services at this time is: \" To manage my anger and pain management \" The problem(s) began when relationships close to the pt have been negatively impacted. Patient has not attempted to resolve these concerns in the past. " "    Social/Family History:  Patient reported they grew up in [place].  They were raised by biological parents.  Parents stayed .   Patient reported that their childhood was supportive and had their needs met by their mom.  Patient described their current relationships with family of origin as \"in contact\". Mom passed away in 2019 and reports not being negatively impacted due to preparation.        The patient had difficulty describing their cultural background but expressed not having many conversations about mental health within the family.  Cultural influences and impact on patient's life structure, values, norms, and healthcare: pt's move to [place] has reinstated care from healthcare providers by feeling heard and understood.  Contextual influences on patient's health include: Contextual Factors: Health-Care Disparities in [place] pt reported lack of care and lack of solutions.  Cultural, Contextual, and socioeconomic factors do not affect the patient's access to services.  These factors will be addressed in the Preliminary Treatment plan.  Patient identified their preferred language to be English. Patient reported they do not  need the assistance of an  or other support involved in therapy.      Patient reported had no significant delays in developmental tasks.   Patient's highest education level was high school graduate. Patient identified the following learning problems: none reported.  Modifications will not be used to assist communication in therapy.   Patient reports they are  able to understand written materials.     Patient reported the following relationship history: before pt's current relationship he was in a four year relationship where he had two daughters, now both aged 19.  Patient's current relationship status is partnered / significant other for 15 years.   Patient identified their sexual orientation as heterosexual.  Patient reported having two child(franck). Patient identified partner, " father, and siblings as part of their support system.  Patient identified the quality of these relationships as good.      Patient's current living/housing situation involves staying with his significant other and they report that housing is stable.      Patient is currently disabled.  Patient reports their finances are obtained through SSDI disability.  Patient does not identify finances as a current stressor.       Patient reported that they have not been involved with the legal system.  Patient denies being on probation / parole / under the jurisdiction of the court.       Patient's Strengths and Limitations:  Patient identified the following strengths or resources that will help them succeed in treatment: friends / good social support and insight. Things that may interfere with the patient's success in treatment include: physical health concerns.      Assessments:  The following assessments were completed by patient for this visit:  PHQ2:       6/21/2023    11:10 AM   PHQ-2 ( 1999 [employer])   Q1: Little interest or pleasure in doing things 0   Q2: Feeling down, depressed or hopeless 1   PHQ-2 Score 1   Q1: Little interest or pleasure in doing things Not at all   Q2: Feeling down, depressed or hopeless Several days   PHQ-2 Score 1     PHQ9:       7/25/2023     8:47 AM 9/1/2023     2:35 PM   PHQ-9 SCORE   PHQ-9 Total Score MyChart 9 (Mild depression) 12 (Moderate depression)   PHQ-9 Total Score 9 12     GAD2:       9/1/2023     2:36 PM   JERICA-2   Feeling nervous, anxious, or on edge 3   Not being able to stop or control worrying 2   JERICA-2 Total Score 5     GAD7:       9/1/2023     2:36 PM   JERICA-7 SCORE   Total Score 17 (severe anxiety)   Total Score 17     CAGE-AID:       9/7/2023     3:17 PM   CAGE-AID Total Score   Total Score 0     PROMIS 10-Global Health (all questions and answers displayed):       9/1/2023     2:37 PM   PROMIS 10   In general, would you say your health is: Poor   In general, would you say your  quality of life is: Poor   In general, how would you rate your physical health? Poor   In general, how would you rate your mental health, including your mood and your ability to think? Poor   In general, how would you rate your satisfaction with your social activities and relationships? Poor   In general, please rate how well you carry out your usual social activities and roles Poor   To what extent are you able to carry out your everyday physical activities such as walking, climbing stairs, carrying groceries, or moving a chair? A little   In the past 7 days, how often have you been bothered by emotional problems such as feeling anxious, depressed, or irritable? Always   In the past 7 days, how would you rate your fatigue on average? Moderate   In the past 7 days, how would you rate your pain on average, where 0 means no pain, and 10 means worst imaginable pain? 7   In general, would you say your health is: 1   In general, would you say your quality of life is: 1   In general, how would you rate your physical health? 1   In general, how would you rate your mental health, including your mood and your ability to think? 1   In general, how would you rate your satisfaction with your social activities and relationships? 1   In general, please rate how well you carry out your usual social activities and roles. (This includes activities at home, at work and in your community, and responsibilities as a parent, child, spouse, employee, friend, etc.) 1   To what extent are you able to carry out your everyday physical activities such as walking, climbing stairs, carrying groceries, or moving a chair? 2   In the past 7 days, how often have you been bothered by emotional problems such as feeling anxious, depressed, or irritable? 5   In the past 7 days, how would you rate your fatigue on average? 3   In the past 7 days, how would you rate your pain on average, where 0 means no pain, and 10 means worst imaginable pain? 7   Global  Mental Health Score 4   Global Physical Health Score 8   PROMIS TOTAL - SUBSCORES 12     [place] Suicide Severity Rating Scale (Lifetime/Recent)      8/1/2023     4:26 PM   [place] Suicide Severity Rating (Lifetime/Recent)   Q1 Wish to be Dead (Lifetime) N   Q2 Non-Specific Active Suicidal Thoughts (Lifetime) N   Actual Attempt (Lifetime) N   Has subject engaged in non-suicidal self-injurious behavior? (Lifetime) N   Interrupted Attempts (Lifetime) N   Aborted or Self-Interrupted Attempt (Lifetime) N   Preparatory Acts or Behavior (Lifetime) N   Calculated C-SSRS Risk Score (Lifetime/Recent) No Risk Indicated       Personal and Family Medical History:  Patient   report a family history of mental health concerns.  Patient reports family history is not on file..     Patient does not report Mental Health Diagnosis or Treatment.      Patient has had a physical exam to rule out medical causes for current symptoms.  Date of last physical exam was within the past year. Symptoms have developed since last physical exam and client was encouraged to follow up with PCP.  . The patient has a [facility] Primary Care Provider, who is named No Ref-Primary, Physician. Patient reports the following current medical concerns:   diabetes and chronic pancreatitis and suspected gastroduodenitis.  Patient reports pain concerns including from chronic pancreatitis.  Patient does want help addressing pain concerns.  There are not significant appetite / nutritional concerns / weight changes. These may include: no concerns. Patient reports the following sleep concerns:  difficulty falling asleep and staying asleep. Pt reports nights of 4 hours of sleep or 8.   Patient does not report a history of head injury / trauma / cognitive impairment.         Current Outpatient Medications:     acetaminophen (TYLENOL) 500 MG tablet, Take 1,000 mg by mouth every 8 hours as needed for mild pain, Disp: , Rfl:     atorvastatin (LIPITOR) 20 MG tablet, Take 1  tablet (20 mg) by mouth daily, Disp: 90 tablet, Rfl: 3    blood glucose (ONETOUCH VERIO IQ) test strip, 1 strip by Other route, Disp: , Rfl:     blood glucose (ONETOUCH VERIO IQ) test strip, Use every morning as directed to measure blood sugar, Disp: , Rfl:     busPIRone (BUSPAR) 5 MG tablet, Take 2 tablets (10 mg) by mouth 3 times daily, Disp: 90 tablet, Rfl: 3    calcium carbonate (TUMS) 500 MG chewable tablet, Take 2 tablets (1,000 mg) by mouth 3 times daily as needed for heartburn, Disp: , Rfl:     cyclobenzaprine (FLEXERIL) 5 MG tablet, Take 1 tablet (5 mg) by mouth 3 times daily as needed for muscle spasms, Disp: 30 tablet, Rfl: 1    DULoxetine (CYMBALTA) 20 MG capsule, Take 1 capsule (20 mg) by mouth daily, Disp: 90 capsule, Rfl: 3    gabapentin (NEURONTIN) 300 MG capsule, Take 1 capsule (300 mg) by mouth 3 times daily, Disp: 90 capsule, Rfl: 3    insulin NPH (NOVOLIN N VIAL) 100 UNIT/ML vial, Inject 25 Units Subcutaneous 2 times daily, Disp: 30 mL, Rfl: 3    insulin regular 100 UNIT/ML vial, Inject Subcutaneous 3 times daily (with meals) Sliding scale: use 5 units for -250, 10 units for -300, 15 units for -400, contact MD for BS over 400, Disp: , Rfl:     lisinopril (ZESTRIL) 10 MG tablet, Take 1 tablet (10 mg) by mouth daily, Disp: 90 tablet, Rfl: 3    LORazepam (ATIVAN) 0.5 MG tablet, Take 1 tablet (0.5 mg) by mouth every 4 hours as needed for anxiety or agitation, Disp: 6 tablet, Rfl: 0    metoclopramide (REGLAN) 5 MG tablet, Take 1 tablet (5 mg) by mouth 3 times daily as needed (with meals as needed), Disp: 90 tablet, Rfl: 0    ondansetron (ZOFRAN ODT) 8 MG ODT tab, Dissolve 1 tablet in mouth every 8 to 12 hours as needed for nausea or vomiting, Disp: 20 tablet, Rfl: 0    oxyCODONE (ROXICODONE) 5 MG tablet, Take 1 tablet (5 mg) by mouth every 6 hours as needed for severe pain, Disp: 6 tablet, Rfl: 0    pantoprazole (PROTONIX) 40 MG EC tablet, Take 1 tablet (40 mg) by mouth 2 times daily  (before meals), Disp: 60 tablet, Rfl: 1    prochlorperazine (COMPAZINE) 10 MG tablet, Take 1 tablet (10 mg) by mouth every 6 hours as needed for nausea or vomiting (Patient not taking: Reported on 7/25/2023), Disp: 20 tablet, Rfl: 0     Medication Adherence:  Patient reports  .  taking prescribed medications as prescribed.    Patient Allergies:    Allergies   Allergen Reactions    Morphine      Other reaction(s): Altered Mental Status    Sulfa Antibiotics Hives and Rash       Medical History:    Past Medical History:   Diagnosis Date    Diabetes (H)     Hypertension          Current Mental Status Exam:   Appearance:  Appropriate    Eye Contact:  Good   Psychomotor:  Normal       Gait / station:  no problem  Attitude / Demeanor: Cooperative  Interested  Speech      Rate / Production: Normal/ Responsive      Volume:  Normal  volume      Language:  intact  Mood:   Normal  Affect:   Appropriate  Expansive    Thought Content: Clear   Thought Process: Coherent       Associations: No loosening of associations  Insight:   Good   Judgment:  Intact   Orientation:  All  Attention/concentration: Good    Substance Use:  Patient reported the following biological family members or relatives with chemical health issues: A period of biological parents depending on alcohol.. Patient has not received substance use disorder and/or gambling treatment in the past. Patient has not ever been to detox. Patient is not currently receiving any chemical dependency treatment. Patient reports no history of support group attendance.           Substance History of use Age of first use Date of last use     Pattern and duration of use (include amounts and frequency)   Alcohol      19 2016 REPORTS SUBSTANCE USE: N/A   Cannabis      16  8/1/23 REPORTS SUBSTANCE USE: reports using substance 1 times per day and has 1 smoke at a time.   Patient reports heaviest use is current use.  Current cannabis use   Amphetamines         REPORTS SUBSTANCE USE: N/A  "  Cocaine/crack            REPORTS SUBSTANCE USE: N/A   Hallucinogens           REPORTS SUBSTANCE USE: N/A   Inhalants           REPORTS SUBSTANCE USE: N/A   Heroin           REPORTS SUBSTANCE USE: N/A   Other Opiates    38  8/1/23 REPORTS SUBSTANCE USE: reports using substance 5 times per week and has 1 pill at a time.   Patient reports heaviest use was n/a.   Benzodiazepine         REPORTS SUBSTANCE USE: N/A   Barbiturates       REPORTS SUBSTANCE USE: N/A   Over the counter meds    31  8/31/2023 REPORTS SUBSTANCE USE: reports using substance 2 times per day and has 1 500mg at a time.   Patient reports heaviest use was n/a.   Caffeine    16  8/31/2023 REPORTS SUBSTANCE USE: reports using substance 1 times per day and has 1 cup of coffee at a time.   Patient reports heaviest use was n/a.   Nicotine     16 9/2022 REPORTS SUBSTANCE USE: reports using substance 1 times per month and has 1 \"hit\" at a time.   Patient reports heaviest use was n/a.   Other substances not listed above:  Identify:        REPORTS SUBSTANCE USE: N/A     Patient reported the following problems as a result of their substance use: no reported problems.  Patient is not concerned about substance use.      Patient reports experiencing the following withdrawal symptoms within the past 12 months: none and the following within the past 30 days: none. Patient reports he has not used more Cannabis/ Hashish than intended or over a longer period of time than intended. Patient reports he has not had unsuccessful attempts to cut down or control use of Cannabis/ Hashish. Patient reports he has not needed to use more Cannabis/ Hashish to achieve the same effect.  Patient does not report diminished effect with use of same amount of Cannabis/ Hashish.      Patient does not report a great deal of time is spent in activities necessary to obtain, use, or recover from Cannabis/ Hashish effects.  Patient does not report important social, occupational, or " recreational activities are given up or reduced because of Cannabis/ Hashish use.  Cannabis/ Hashish use is continued despite knowledge of having a persistent or recurrent physical or psychological problem that is likely to have caused or exacerbated by use.       Patient reports substance use has not ever impacted their ability to function in a school setting. Patient reports substance use has not ever impacted their ability to function in a work setting.  Patients demographics and history impact their recovery in the following ways: pain management.      Patient does not have a history of gambling concerns and/or treatment.  Patient does not have other addictive behaviors he is concerned about.    Significant Losses / Trauma / Abuse / Neglect Issues:   Patient   did not serve in the .  There are indications or report of significant loss, trauma, abuse or neglect issues related to: are indications of trauma or loss but client denies these concerns.  Concerns for possible neglect are not present. Loss of his mom in 2019.    Safety Assessment:   Patient denies current homicidal ideation and behaviors.  Patient denies current self-injurious ideation and behaviors.    Patient denied risk behaviors associated with substance use.  Patient denies any high risk behaviors associated with mental health symptoms.  Patient reports the following current concerns for their personal safety: None.  Patient reports there   firearms in the house.       There are no firearms in the home..    History of Safety Concerns:  Patient denied a history of homicidal ideation.     Patient denied a history of personal safety concerns.    Patient denied a history of assaultive behaviors.    Patient denied a history of sexual assault behaviors.     Patient denied a history of risk behaviors associated with substance use.  Patient denies any history of high risk behaviors associated with mental health symptoms.  Patient reports the  following protective factors:       Risk Plan:  See Recommendations for Safety and Risk Management Plan    Review of Symptoms per patient report:   Diagnostic Criteria:   Generalized Anxiety Disorder  A. Excessive anxiety and worry about a number of events or activities (such as work or school performance).   B. The person finds it difficult to control the worry.  C. Select 3 or more symptoms (required for diagnosis). Only one item is required in children.   - Restlessness or feeling keyed up or on edge.    - Being easily fatigued.    - Difficulty concentrating or mind going blank.    - Irritability.    - Muscle tension.    - Sleep disturbance (difficulty falling or staying asleep, or restless unsatisfying sleep).   D. The focus of the anxiety and worry is not confined to features of an Axis I disorder.  E. The anxiety, worry, or physical symptoms cause clinically significant distress or impairment in social, occupational, or other important areas of functioning.   F. The disturbance is not due to the direct physiological effects of a substance (e.g., a drug of abuse, a medication) or a general medical condition (e.g., hyperthyroidism) and does not occur exclusively during a Mood Disorder, a Psychotic Disorder, or a Pervasive Developmental Disorder.    Functional Status:  Patient reports the following functional impairments:  chronic disease management, management of the household and or completion of tasks, organization, relationship(s), self-care, and social interactions.     Nonprogrammatic care:  Patient is requesting basic services to address current mental health concerns.    Clinical Summary:  1. Reason for assessment: Anxiety symptoms, anger, and pain management.  2. Psychosocial, Cultural and Contextual Factors: Physical symptoms since 2013.  3. Principal DSM5 Diagnoses  (Sustained by DSM5 Criteria Listed Above):   300.02 (F41.1) Generalized Anxiety Disorder; 296.22 (F32.1)  Major Depressive Disorder,  Single Episode, Moderate _  4. Other Diagnoses that is relevant to services: n/a.  5. Provisional Diagnosis:  296.22 (F32.1)  Major Depressive Disorder, Single Episode, Moderate _ as evidenced by PHQ-9 .  6. Prognosis: Return to Baseline Functioning.  7. Likely consequences of symptoms if not treated: worsening mental health symptoms.  8. Client strengths include:  good listener, insightful, and motivated .     Recommendations:     1. Plan for Safety and Risk Management:   Safety and Risk:  no risk indicated .          Report to child / adult protection services was NA.     2. Patient's identified mental health concerns with a cultural influence will be addressed by sessions .     3. Initial Treatment will focus on:    Depressed Mood -    Anxiety -    Anger Management -   .     4. Resources/Service Plan:    services are not indicated.   Modifications to assist communication are not indicated.   Additional disability accommodations are not indicated.      5. Collaboration:   Collaboration / coordination of treatment will be initiated with the following  support professionals:  n/a .      6.  Referrals:   The following referral(s) will be initiated:  n/a .      A Release of Information has been obtained for the following:  n/a .     Emergency Contact  was obtained.     7. SALENA:    SALENA:  Discussed the general effects of drugs and alcohol on health and well-being.     8. Records:   These were reviewed at time of assessment.   Information in this assessment was obtained from the medical record and  provided by patient who is a good historian.    Patient will have open access to their mental health medical record.    9.   Interactive Complexity: Yes, visit entailed Interactive Complexity evidenced by:assessing safety and conducting DA.    Provider Name/ Credentials:  FRANCESCO Menchaca  September 1, 2023     This note has been reviewed and I agree with the plan of care. This note is co-signed by Brigitte  PEDRITO Shah, [facility] CenterSW, Supervisor, on:9/07/2023

## 2023-09-05 ENCOUNTER — OFFICE VISIT (OUTPATIENT)
Dept: OPTOMETRY | Facility: CLINIC | Age: 38
End: 2023-09-05
Payer: MEDICARE

## 2023-09-05 DIAGNOSIS — H52.223 REGULAR ASTIGMATISM OF BOTH EYES: ICD-10-CM

## 2023-09-05 DIAGNOSIS — E11.9 TYPE 2 DIABETES MELLITUS WITHOUT COMPLICATION, WITH LONG-TERM CURRENT USE OF INSULIN (H): Primary | ICD-10-CM

## 2023-09-05 DIAGNOSIS — H52.11 MYOPIA, RIGHT: ICD-10-CM

## 2023-09-05 DIAGNOSIS — Z79.4 TYPE 2 DIABETES MELLITUS WITHOUT COMPLICATION, WITH LONG-TERM CURRENT USE OF INSULIN (H): Primary | ICD-10-CM

## 2023-09-05 PROCEDURE — 92015 DETERMINE REFRACTIVE STATE: CPT | Mod: GY | Performed by: OPTOMETRIST

## 2023-09-05 PROCEDURE — 92004 COMPRE OPH EXAM NEW PT 1/>: CPT | Performed by: OPTOMETRIST

## 2023-09-05 ASSESSMENT — VISUAL ACUITY
OS_SC: 20/30
METHOD: SNELLEN - LINEAR
OD_SC: 20/40
OS_SC+: -1
OD_SC+: +1
OD_PH_SC: 20/25
OS_SC: 20/20-1
OD_SC: 20/20-1
OD_PH_SC+: -2

## 2023-09-05 ASSESSMENT — KERATOMETRY
OD_K2POWER_DIOPTERS: 44.00
OD_K1POWER_DIOPTERS: 43.00
OD_AXISANGLE2_DEGREES: 145
OS_AXISANGLE2_DEGREES: 9
OS_K2POWER_DIOPTERS: 44.25
OS_K1POWER_DIOPTERS: 43.50

## 2023-09-05 ASSESSMENT — SLIT LAMP EXAM - LIDS
COMMENTS: NORMAL
COMMENTS: NORMAL

## 2023-09-05 ASSESSMENT — REFRACTION_MANIFEST
OS_AXIS: 145
OD_CYLINDER: +0.75
OD_CYLINDER: +0.75
OD_SPHERE: -1.25
OS_AXIS: 136
OD_SPHERE: -1.50
OD_AXIS: 030
OS_SPHERE: -0.75
OS_SPHERE: -1.25
OS_CYLINDER: +0.50
OS_CYLINDER: +0.50
OD_AXIS: 023

## 2023-09-05 ASSESSMENT — CUP TO DISC RATIO
OS_RATIO: 0.0
OD_RATIO: 0.0

## 2023-09-05 ASSESSMENT — CONF VISUAL FIELD
METHOD: COUNTING FINGERS
OD_INFERIOR_NASAL_RESTRICTION: 0
OD_NORMAL: 1
OD_SUPERIOR_TEMPORAL_RESTRICTION: 0
OD_SUPERIOR_NASAL_RESTRICTION: 0
OD_INFERIOR_TEMPORAL_RESTRICTION: 0

## 2023-09-05 ASSESSMENT — TONOMETRY
IOP_METHOD: APPLANATION
OS_IOP_MMHG: 16
OD_IOP_MMHG: 16

## 2023-09-05 NOTE — PROGRESS NOTES
Chief Complaint   Patient presents with    Diabetic Eye Exam     Accompanied by family  Chief Complaint(s) and History of Present Illness(es)       Diabetic Eye Exam              Diabetes Type: Type 2 and on insulin    Blood Sugars: is controlled                   Lab Results   Component Value Date    A1C 6.6 06/21/2023     Usually no fluctuations in vision, if yes he checks his sugar levels and it is usually off    Would like to see better for night driving      [place]   Dilated Previously: Yes    What are you currently using to see?  does not use glasses or contacts    Distance Vision Acuity: Satisfied with vision, unless his blood sugars are off     Near Vision Acuity: Satisfied with vision while reading and using computer unaided    Eye Comfort: good  Do you use eye drops? : No  Occupation or Hobbies: Disabled , screen time     Renetta Apple Optometric Assistant      Medical, surgical and family histories reviewed and updated 9/5/2023.       OBJECTIVE: See Ophthalmology exam    ASSESSMENT:    ICD-10-CM    1. Type 2 diabetes mellitus without complication, with long-term current use of insulin (H)  E11.9     Z79.4       2. Myopia, right  H52.11       3. Regular astigmatism of both eyes  H52.223           PLAN:    Elias Garcia aware  eye exam results will be sent to No Ref-Primary, Physician.  Patient Instructions   Fill glasses prescription  Allow 2 weeks to adapt to change in glasses  Return to clinic to verify glasses and for glasses check as needed.   Keep blood sugar under good control  Return in 1 year for diabetic eye exam      Blood sugar and blood pressure control are very important in the prevention of ocular complications from diabetes.       Jerri Jenkins, OD  [phone]

## 2023-09-05 NOTE — PATIENT INSTRUCTIONS
Fill glasses prescription  Allow 2 weeks to adapt to change in glasses  Return to clinic to verify glasses and for glasses check as needed.   Keep blood sugar under good control  Return in 1 year for diabetic eye exam      Blood sugar and blood pressure control are very important in the prevention of ocular complications from diabetes.       Jerri Jenkins, OD  [phone]

## 2023-09-05 NOTE — LETTER
9/5/2023         RE: Elias Garcia  [address]  [facility] [address]        Dear Colleague,    Thank you for referring your patient, Elias Garcia, to the [facility]. No diabetic retinopathy was found at eye exam. Yearly eye exam advised.  Please see a copy of my visit note below.    Chief Complaint   Patient presents with     Diabetic Eye Exam     Accompanied by family  Chief Complaint(s) and History of Present Illness(es)       Diabetic Eye Exam              Diabetes Type: Type 2 and on insulin    Blood Sugars: is controlled                   Lab Results   Component Value Date    A1C 6.6 06/21/2023     Usually no fluctuations in vision, if yes he checks his sugar levels and it is usually off    Would like to see better for night driving      [place] 2 [place]   Dilated Previously: Yes    What are you currently using to see?  does not use glasses or contacts    Distance Vision Acuity: Satisfied with vision, unless his blood sugars are off     Near Vision Acuity: Satisfied with vision while reading and using computer unaided    Eye Comfort: good  Do you use eye drops? : No  Occupation or Hobbies: Disabled , screen time     Renetta Apple Optometric Assistant      Medical, surgical and family histories reviewed and updated 9/5/2023.       OBJECTIVE: See Ophthalmology exam    ASSESSMENT:    ICD-10-CM    1. Type 2 diabetes mellitus without complication, with long-term current use of insulin (H)  E11.9     Z79.4       2. Myopia, right  H52.11       3. Regular astigmatism of both eyes  H52.223           PLAN:    Elias Garcia aware  eye exam results will be sent to No Ref-Primary, Physician.  Patient Instructions   Fill glasses prescription  Allow 2 weeks to adapt to change in glasses  Return to clinic to verify glasses and for glasses check as needed.   Keep blood sugar under good control  Return in 1 year for diabetic eye exam      Blood sugar and blood pressure control are very  important in the prevention of ocular complications from diabetes.       Jerri Jenkins, OD  [phone]                      Again, thank you for allowing me to participate in the care of your patient.        Sincerely,        Jerri Jenkins, OD

## 2023-09-12 ENCOUNTER — TELEPHONE (OUTPATIENT)
Dept: PSYCHOLOGY | Facility: CLINIC | Age: 38
End: 2023-09-12
Payer: MEDICARE

## 2023-10-22 ENCOUNTER — HEALTH MAINTENANCE LETTER (OUTPATIENT)
Age: 38
End: 2023-10-22

## 2024-03-08 DIAGNOSIS — E10.40 TYPE 1 DIABETES MELLITUS WITH DIABETIC NEUROPATHY (H): ICD-10-CM

## 2024-03-08 RX ORDER — HUMAN INSULIN 100 [IU]/ML
25 INJECTION, SUSPENSION SUBCUTANEOUS 2 TIMES DAILY
Qty: 30 ML | Refills: 3 | Status: SHIPPED | OUTPATIENT
Start: 2024-03-08

## 2024-03-10 ENCOUNTER — HEALTH MAINTENANCE LETTER (OUTPATIENT)
Age: 39
End: 2024-03-10

## 2024-03-14 ENCOUNTER — TELEPHONE (OUTPATIENT)
Dept: FAMILY MEDICINE | Facility: CLINIC | Age: 39
End: 2024-03-14
Payer: MEDICARE

## 2024-03-14 NOTE — TELEPHONE ENCOUNTER
Message sent to patient regarding medication coverage and need for an appointment.    Kristine Allred XRO/

## 2024-05-31 ENCOUNTER — TELEPHONE (OUTPATIENT)
Dept: FAMILY MEDICINE | Facility: CLINIC | Age: 39
End: 2024-05-31
Payer: MEDICARE

## 2024-05-31 NOTE — TELEPHONE ENCOUNTER
Prior Authorization Retail Medication Request    Medication/Dose: nsulin NPH (NOVOLIN N VIAL) 100 UNIT/ML vial  Diagnosis and ICD code (if different than what is on RX):    Type 1 diabetes mellitus with diabetic neuropathy (H) [E10.40]     New/renewal/insurance change PA/secondary ins. PA:  Previously Tried and Failed:    Rationale:      Insurance   Primary: Medicare   Insurance ID:      [id number]       Secondary (if applicable):  Insurance ID:      Pharmacy Information (if different than what is on RX)  Name:  [facility]  Phone:  [phone]   Fax:    [phone]

## 2024-06-11 NOTE — TELEPHONE ENCOUNTER
Retail Pharmacy Prior Authorization Team   Phone: [phone]    PA Initiation    Medication: NOVOLIN N VIAL 100 UNIT/ML [facility]  Insurance Company: Other (see comments)Comment:  [employer]  Pharmacy Filling the Rx: [facility] [address] [place] [address] - [address] [place] [address]  Filling Pharmacy Phone: [phone]  Filling Pharmacy Fax:    Start Date: 6/11/2024    Started PA on CMM and a response of Available without authorization. The member is able to fill the requested drug at the pharmacy. If coverage is still needed or requesting prior to the expiration of a current authorization, a request can be made by sending a fax or calling the number on the back of the member's ID card.    Called pharmacy, they state to disregard PA request.  The patient is using the Novolin N Flexpen and it goes through insurance and the patient has picked up.

## 2024-07-28 ENCOUNTER — HEALTH MAINTENANCE LETTER (OUTPATIENT)
Age: 39
End: 2024-07-28

## 2024-12-15 ENCOUNTER — HEALTH MAINTENANCE LETTER (OUTPATIENT)
Age: 39
End: 2024-12-15

## 2025-03-16 ENCOUNTER — HEALTH MAINTENANCE LETTER (OUTPATIENT)
Age: 40
End: 2025-03-16

## 2025-06-29 ENCOUNTER — HEALTH MAINTENANCE LETTER (OUTPATIENT)
Age: 40
End: 2025-06-29

## 2025-08-10 ENCOUNTER — HEALTH MAINTENANCE LETTER (OUTPATIENT)
Age: 40
End: 2025-08-10